# Patient Record
Sex: FEMALE | Race: WHITE | NOT HISPANIC OR LATINO | Employment: OTHER | URBAN - METROPOLITAN AREA
[De-identification: names, ages, dates, MRNs, and addresses within clinical notes are randomized per-mention and may not be internally consistent; named-entity substitution may affect disease eponyms.]

---

## 2024-04-28 ENCOUNTER — ANCILLARY PROCEDURE (OUTPATIENT)
Dept: GENERAL RADIOLOGY | Facility: CLINIC | Age: 74
End: 2024-04-28
Payer: MEDICARE

## 2024-04-28 ENCOUNTER — OFFICE VISIT (OUTPATIENT)
Dept: URGENT CARE | Facility: URGENT CARE | Age: 74
End: 2024-04-28
Payer: MEDICARE

## 2024-04-28 VITALS
OXYGEN SATURATION: 97 % | TEMPERATURE: 98.8 F | HEART RATE: 96 BPM | WEIGHT: 164.5 LBS | SYSTOLIC BLOOD PRESSURE: 123 MMHG | DIASTOLIC BLOOD PRESSURE: 76 MMHG | RESPIRATION RATE: 23 BRPM

## 2024-04-28 DIAGNOSIS — J45.901 EXACERBATION OF ASTHMA, UNSPECIFIED ASTHMA SEVERITY, UNSPECIFIED WHETHER PERSISTENT: ICD-10-CM

## 2024-04-28 DIAGNOSIS — J44.1 COPD EXACERBATION (H): Primary | ICD-10-CM

## 2024-04-28 DIAGNOSIS — R05.3 CHRONIC COUGH: ICD-10-CM

## 2024-04-28 PROCEDURE — 99203 OFFICE O/P NEW LOW 30 MIN: CPT | Mod: 25

## 2024-04-28 PROCEDURE — 94640 AIRWAY INHALATION TREATMENT: CPT

## 2024-04-28 PROCEDURE — 71046 X-RAY EXAM CHEST 2 VIEWS: CPT | Mod: TC | Performed by: RADIOLOGY

## 2024-04-28 RX ORDER — IPRATROPIUM BROMIDE AND ALBUTEROL SULFATE 2.5; .5 MG/3ML; MG/3ML
1 SOLUTION RESPIRATORY (INHALATION) EVERY 6 HOURS PRN
Qty: 90 ML | Refills: 0 | Status: CANCELLED | OUTPATIENT
Start: 2024-04-28

## 2024-04-28 RX ORDER — IBUPROFEN 800 MG/1
800 TABLET, FILM COATED ORAL EVERY 8 HOURS PRN
COMMUNITY
Start: 2023-06-05 | End: 2024-05-23

## 2024-04-28 RX ORDER — FLUTICASONE PROPIONATE AND SALMETEROL 500; 50 UG/1; UG/1
1 POWDER RESPIRATORY (INHALATION) 2 TIMES DAILY
COMMUNITY
End: 2024-09-18

## 2024-04-28 RX ORDER — AZITHROMYCIN 250 MG/1
TABLET, FILM COATED ORAL
Qty: 6 TABLET | Refills: 0 | Status: SHIPPED | OUTPATIENT
Start: 2024-04-28 | End: 2024-05-03

## 2024-04-28 RX ORDER — POTASSIUM CHLORIDE 750 MG/1
1 TABLET, EXTENDED RELEASE ORAL DAILY
COMMUNITY
Start: 2023-11-28 | End: 2024-09-17

## 2024-04-28 RX ORDER — ALENDRONATE SODIUM 70 MG/1
70 TABLET ORAL
COMMUNITY
Start: 2022-06-22 | End: 2024-09-17

## 2024-04-28 RX ORDER — BENZONATATE 200 MG/1
200 CAPSULE ORAL 3 TIMES DAILY PRN
COMMUNITY
Start: 2024-03-15 | End: 2024-05-23

## 2024-04-28 RX ORDER — ALBUTEROL SULFATE 90 UG/1
2 AEROSOL, METERED RESPIRATORY (INHALATION) EVERY 4 HOURS PRN
COMMUNITY
Start: 2023-09-29 | End: 2024-09-18

## 2024-04-28 RX ORDER — ALPRAZOLAM 0.25 MG
0.25 TABLET ORAL
COMMUNITY
Start: 2022-06-22

## 2024-04-28 RX ORDER — IPRATROPIUM BROMIDE AND ALBUTEROL SULFATE 2.5; .5 MG/3ML; MG/3ML
1 SOLUTION RESPIRATORY (INHALATION) EVERY 6 HOURS PRN
Qty: 90 ML | Refills: 0 | Status: SHIPPED | OUTPATIENT
Start: 2024-04-28

## 2024-04-28 RX ORDER — MONTELUKAST SODIUM 10 MG/1
1 TABLET ORAL AT BEDTIME
COMMUNITY
Start: 2023-09-06 | End: 2024-05-23

## 2024-04-28 RX ORDER — IPRATROPIUM BROMIDE AND ALBUTEROL SULFATE 2.5; .5 MG/3ML; MG/3ML
3 SOLUTION RESPIRATORY (INHALATION) ONCE
Status: COMPLETED | OUTPATIENT
Start: 2024-04-28 | End: 2024-04-28

## 2024-04-28 RX ORDER — METHIMAZOLE 10 MG/1
1 TABLET ORAL 3 TIMES DAILY
COMMUNITY
Start: 2023-08-23 | End: 2024-09-03

## 2024-04-28 RX ORDER — DM/P-EPHED/ACETAMINOPH/DOXYLAM
5000 LIQUID (ML) ORAL DAILY
COMMUNITY
Start: 2023-01-08 | End: 2024-09-17

## 2024-04-28 RX ORDER — CODEINE PHOSPHATE AND GUAIFENESIN 10; 100 MG/5ML; MG/5ML
5-10 SOLUTION ORAL
COMMUNITY
Start: 2024-03-14 | End: 2024-05-23

## 2024-04-28 RX ADMIN — IPRATROPIUM BROMIDE AND ALBUTEROL SULFATE 3 ML: 2.5; .5 SOLUTION RESPIRATORY (INHALATION) at 12:49

## 2024-04-28 NOTE — PROGRESS NOTES
ASSESSMENT:  (J44.1) COPD exacerbation (H)  (primary encounter diagnosis)  Plan: XR Chest 2 Views, ipratropium - albuterol 0.5         mg/2.5 mg/3 mL (DUONEB) neb solution 3 mL,         ipratropium - albuterol 0.5 mg/2.5 mg/3 mL         (DUONEB) 0.5-2.5 (3) MG/3ML neb solution,         Nebulizer and Supplies Order for DME - ONLY FOR        DME, azithromycin (ZITHROMAX) 250 MG tablet,         Adult Pulmonary Medicine  Referral    (R05.3) Chronic cough  Plan: XR Chest 2 Views, Adult Pulmonary Medicine          Referral    (J45.901) Exacerbation of asthma, unspecified asthma severity, unspecified whether persistent  Plan: XR Chest 2 Views, Adult Pulmonary Medicine          Referral    PLAN:  Reassessment of the patient after the DuoNeb treatment; the patient indicates that her breathing feels better and she is not wheezing as much as prior to the DuoNeb treatment.  Lungs upon auscultation yields improved airflow and are without wheezing.  Informed the patient that the chest x-ray shows punctate radiopacities project over the bilateral upper chest per the radiologist report.  Given the patient's COPD exacerbation; an antibiotic was prescribed.  In addition, given the improvement upon the patient's breathing and wheezing; DuoNeb prescribed for use at home.  Informed the patient to take the antibiotic as prescribed and finish the full course even if symptoms improve.  We discussed using the DuoNeb as prescribed.  Instructed the patient to follow-up with pulmonology for further evaluation and treatment and go to the emergency department with any new or worsening symptoms.  Patient acknowledged her understanding of the above plan.    The use of Dragon/Apture dictation services may have been used to construct the content in this note; any grammatical or spelling errors are non-intentional. Please contact the author of this note directly if you are in need of any clarification.      Seth VIEIRA  SHAD Dubois CNP      SUBJECTIVE:  Monique Gupta is a 74 year old female who presents to the clinic today with a chief complaint of cough , shortness of breath., wheezing., and chest tightness for 2 month(s).  Her cough is described as slightly productive.    The patient's symptoms are severe and worsening.  Associated symptoms include none. The patient's symptoms are exacerbated by no particular triggers  Patient has been using albuterol, Advair and singular to improve symptoms.  She has also had doxycycline which made her sick and prednisone to help with her symptoms.      ROS  Negative except noted above.     OBJECTIVE:  /76   Pulse 96   Temp 98.8  F (37.1  C) (Tympanic)   Resp 23   Wt 74.6 kg (164 lb 8 oz)   SpO2 97%   GENERAL APPEARANCE: healthy, alert and no distress  EYES: EOMI,  PERRL, conjunctiva clear  HENT: nose and mouth without erythema, ulcers or lesions and oral mucous membranes moist, no erythema noted  NECK: supple, nontender, no lymphadenopathy  RESP: expiratory wheezes bilaterally and decreased breath sounds bilaterally  CV: regular rates and rhythm, normal S1 S2, no murmur noted  SKIN: no suspicious lesions or rashes    X-RAY: chest x-ray shows punctate radiopacities project over the bilateral upper chest per the radiologist report.

## 2024-04-28 NOTE — PATIENT INSTRUCTIONS
Chest x-ray shows punctate radiopacities project over the bilateral upper chest per the radiologist report.  Take the antibiotic as prescribed and finish the full course even if symptoms improve.  Use the duoneb medication as prescribed.  Follow up with pulmonology for further evaluation and treatment.  Go to the emergency department with any new or worsening symptoms.

## 2024-04-29 ENCOUNTER — TELEPHONE (OUTPATIENT)
Dept: PULMONOLOGY | Facility: CLINIC | Age: 74
End: 2024-04-29
Payer: MEDICARE

## 2024-04-29 DIAGNOSIS — J44.9 COPD (CHRONIC OBSTRUCTIVE PULMONARY DISEASE) (H): Primary | ICD-10-CM

## 2024-04-29 NOTE — TELEPHONE ENCOUNTER
Called and spoke to pt to reschedule July appt with Ervin to Dr Montero on 5/7, due to 3-5 day urgent referral placed by urgent care. Pt is aware of appt time and location.

## 2024-05-19 ENCOUNTER — HEALTH MAINTENANCE LETTER (OUTPATIENT)
Age: 74
End: 2024-05-19

## 2024-05-23 ENCOUNTER — ANCILLARY PROCEDURE (OUTPATIENT)
Dept: GENERAL RADIOLOGY | Facility: CLINIC | Age: 74
End: 2024-05-23
Attending: PREVENTIVE MEDICINE
Payer: MEDICARE

## 2024-05-23 ENCOUNTER — OFFICE VISIT (OUTPATIENT)
Dept: FAMILY MEDICINE | Facility: CLINIC | Age: 74
End: 2024-05-23
Payer: MEDICARE

## 2024-05-23 ENCOUNTER — ORDERS ONLY (AUTO-RELEASED) (OUTPATIENT)
Dept: FAMILY MEDICINE | Facility: CLINIC | Age: 74
End: 2024-05-23

## 2024-05-23 VITALS
BODY MASS INDEX: 27.79 KG/M2 | OXYGEN SATURATION: 99 % | HEIGHT: 64 IN | TEMPERATURE: 97.8 F | SYSTOLIC BLOOD PRESSURE: 121 MMHG | HEART RATE: 80 BPM | RESPIRATION RATE: 16 BRPM | DIASTOLIC BLOOD PRESSURE: 76 MMHG | WEIGHT: 162.8 LBS

## 2024-05-23 DIAGNOSIS — Z12.11 SCREEN FOR COLON CANCER: ICD-10-CM

## 2024-05-23 DIAGNOSIS — E05.00 GRAVES DISEASE: ICD-10-CM

## 2024-05-23 DIAGNOSIS — M25.562 CHRONIC PAIN OF LEFT KNEE: ICD-10-CM

## 2024-05-23 DIAGNOSIS — G89.29 CHRONIC PAIN OF LEFT KNEE: ICD-10-CM

## 2024-05-23 DIAGNOSIS — M54.16 LEFT LUMBAR RADICULOPATHY: Primary | ICD-10-CM

## 2024-05-23 DIAGNOSIS — K44.9 HIATAL HERNIA: ICD-10-CM

## 2024-05-23 DIAGNOSIS — M54.16 LEFT LUMBAR RADICULOPATHY: ICD-10-CM

## 2024-05-23 DIAGNOSIS — Z63.6 CAREGIVER STRESS: ICD-10-CM

## 2024-05-23 DIAGNOSIS — Z12.31 VISIT FOR SCREENING MAMMOGRAM: ICD-10-CM

## 2024-05-23 PROBLEM — Z86.19 HISTORY OF HEPATITIS C: Status: ACTIVE | Noted: 2021-03-23

## 2024-05-23 PROCEDURE — 72100 X-RAY EXAM L-S SPINE 2/3 VWS: CPT | Mod: TC | Performed by: RADIOLOGY

## 2024-05-23 PROCEDURE — 99214 OFFICE O/P EST MOD 30 MIN: CPT | Performed by: PREVENTIVE MEDICINE

## 2024-05-23 PROCEDURE — 73562 X-RAY EXAM OF KNEE 3: CPT | Mod: TC | Performed by: RADIOLOGY

## 2024-05-23 RX ORDER — GABAPENTIN 300 MG/1
300 CAPSULE ORAL AT BEDTIME
Qty: 30 CAPSULE | Refills: 2 | Status: SHIPPED | OUTPATIENT
Start: 2024-05-23 | End: 2024-09-16

## 2024-05-23 RX ORDER — RESPIRATORY SYNCYTIAL VIRUS VACCINE 120MCG/0.5
0.5 KIT INTRAMUSCULAR ONCE
Qty: 1 EACH | Refills: 0 | Status: CANCELLED | OUTPATIENT
Start: 2024-05-23 | End: 2024-05-23

## 2024-05-23 RX ORDER — IBUPROFEN 600 MG/1
600 TABLET, FILM COATED ORAL EVERY 8 HOURS PRN
Qty: 90 TABLET | Refills: 0 | Status: SHIPPED | OUTPATIENT
Start: 2024-05-23 | End: 2024-09-03

## 2024-05-23 SDOH — SOCIAL STABILITY - SOCIAL INSECURITY: DEPENDENT RELATIVE NEEDING CARE AT HOME: Z63.6

## 2024-05-23 NOTE — PROGRESS NOTES
"  Assessment & Plan     Left lumbar radiculopathy  - REVIEW OF HEALTH MAINTENANCE PROTOCOL ORDERS  - XR Lumbar Spine 2/3 Views  - gabapentin (NEURONTIN) 300 MG capsule  Dispense: 30 capsule; Refill: 2  - Physical Therapy  Referral    Caregiver stress  -boyfriend with recent diagnosis of Lung cancer   - Adult Mental Health  Referral    Hiatal hernia  -refill on medication provided   - omeprazole (PRILOSEC) 20 MG DR capsule  Dispense: 90 capsule; Refill: 0    Chronic pain of left knee  -await imaging   -likely a popliteal cyst present  - XR Knee Left 3 Views  - ibuprofen (ADVIL/MOTRIN) 600 MG tablet  Dispense: 90 tablet; Refill: 0, GI side effects reviewed, no past renal disease or GI bleeds     Graves disease  -On Methimazole and Propranolol rarely if feels palpitations   - Adult Endocrinology  Referral    Screen for colon cancer  - [facility]([employer])    Visit for screening mammogram  - MA Screening Bilateral w/ Juve            BMI  Estimated body mass index is 28.39 kg/m  as calculated from the following:    Height as of this encounter: 1.613 m (5' 3.5\").    Weight as of this encounter: 73.8 kg (162 lb 12.8 oz).       Christine Amaya is a 74 year old, presenting for the following health issues:  established care and Pain (Pain from lower back down to knee)        5/23/2024    11:00 AM   Additional Questions   Roomed by Angie   Accompanied by self         5/23/2024    11:00 AM   Patient Reported Additional Medications   Patient reports taking the following new medications No     History of Present Illness       Reason for visit:  Thigh/knee pain, other, swollen ankles, OCD lesion in right ankle, need colonoscopy, endocrinologist ck Flores    She eats 2-3 servings of fruits and vegetables daily.She consumes 2 sweetened beverage(s) daily.She exercises with enough effort to increase her heart rate 9 or less minutes per day.  She exercises with enough effort to increase her heart rate " 3 or less days per week.   She is taking medications regularly.       Recent move here from [place]   Graves+  Just moved here in the last year  Was supposed to follow up, last seen in [place]  Taking medication Methimazole  Takes propranolol taken once every few month depending on symptoms    Mammogram needed  DEXA 3/23 IMPRESSION:  WHO Classification based on BMD: Osteoporosis (T-Score of -2.5 or lower).   Boyfriend with lung cancer, is care giver+       Breathing still with cough  ECHO done 10/22 with [facility] in [place], normal LVEF   Pulmonary appointment scheduled and for PFTs   Prednisone+ Antibiotics+  Was supposed to see Allergy in [place] but moved to [place]   2021 and 2022 had similar symptoms  No tobacco use    Concern - Pain in lower left back   Onset: A year ago   Description: pain radiates down to knees and feels a lump behind the knee   Intensity: severe  Progression of Symptoms:  worsening  Accompanying Signs & Symptoms: None  Previous history of similar problem: None   Precipitating factors:        Worsened by: Worsen at night   Alleviating factors:        Improved by: None   Therapies tried and outcome: OTC medications, prescribe medications did not help with pain.   Left hip and radiates to anterior thigh  Lump behind left knee  Some leg edema  800 mg of Motrin  Naprosyn  Pain is keeping awake at night      Concern - Blood in stool  Onset: A month ago    Description: patient states that there is no blood in stool but when wiping there is blood  Progression of Symptoms:  sometimes there is blood and sometimes there is nothing  Accompanying Signs & Symptoms: None  Previous history of similar problem: None  Therapies tried and outcome: None  Not in the stool  Just on wiping  Bright red blood  No pain  No lump at the rectum.   Slight constipation and that is when it started.   Better when not constipated     Review of Systems  Constitutional, HEENT, cardiovascular, pulmonary, gi and gu systems are  "negative, except as otherwise noted.      Objective    /76 (BP Location: Left arm, Patient Position: Sitting, Cuff Size: Adult Regular)   Pulse 80   Temp 97.8  F (36.6  C) (Tympanic)   Resp 16   Ht 1.613 m (5' 3.5\")   Wt 73.8 kg (162 lb 12.8 oz)   SpO2 99%   BMI 28.39 kg/m    Body mass index is 28.39 kg/m .  Physical Exam   GENERAL APPEARANCE: healthy, alert and no distress  EYES: Eyes grossly normal to inspection and conjunctivae and sclerae normal  NECK: no adenopathy and trachea midline and normal to palpation  RESP: few basilar crackles+   CV: regular rates and rhythm, normal S1 S2  ABDOMEN: soft, non-tender and no rebound or guarding   MS: extremities normal- no gross deformities noted and peripheral pulses normal  SKIN: no suspicious lesions or rashes  NEURO: Normal strength and tone, mentation intact and speech normal  PSYCH: mentation appears normal    Lumbar spine: No swelling, bruising, erythema atrophy or deformity.  No tenderness noted on palpation of spinous processes.  Range of motion of the lumbar spine is full in all directions without focal deficit.  Strength is full.  SLR negative bilaterally.  Distal pulses intact. No sacroiliac tenderness bilaterally.  Great toe extension normal.     Left knee: strength and range of motion intact, tender along medial joint line+ and small cyst + in popliteal fossa         No results found for this or any previous visit (from the past 24 hour(s)).        Signed Electronically by: Gabriella Cuenca MD MPH      "

## 2024-05-23 NOTE — RESULT ENCOUNTER NOTE
Monique,     X rays are not showing any acute bony abnormalities.  Mild calcium deposition arthritis seen.     Please do not hesitate to call us at [phone] if you have any questions or concerns.    Thank you,    Gabriella Cuenca MD MPH

## 2024-05-23 NOTE — PATIENT INSTRUCTIONS
Referral Details    Referred By  Referred To   Gabriella Cuenca MD   [address] [facility] [address]   Phone: [phone]   Fax: [phone]    Diagnoses: Graves disease   Order: Adult Endocrinology  Referral       Comment: Please be aware that coverage of these services is subject to the terms and limitations of your health insurance plan.  Call member services at your health plan with any benefit or coverage questions.    [employer] will call you to coordinate your care as prescribed by your provider. If you don't hear from a representative within 2 business days, please call [phone].           Gabriella Cuenca MD   [address] [facility] [address]   Phone: [phone]   Fax: [phone]    Diagnoses: Left lumbar radiculopathy   Order: Physical Therapy  Referral       Comment: Please be aware that coverage of these services is subject to the terms and limitations of your health insurance plan.  Call member services at your health plan with any benefit or coverage questions.    [employer] will call you to coordinate your care as prescribed by your provider. If you don't hear from a representative within 2 business days, please call [phone].        Gabriella Cuenca MD   [address] [facility] [address]   Phone: [phone]   Fax: [phone]    Diagnoses: Caregiver stress   Order: Adult Mental Health  Referral       Comment: Please be aware that coverage of these services is subject to the terms and limitations of your health insurance plan.  Call member services at your health plan with any benefit or coverage questions.    [employer] will call you to coordinate your care as prescribed by your provider. If you don't hear from a representative within 2 business days, please call [phone].

## 2024-05-23 NOTE — RESULT ENCOUNTER NOTE
Monique,     X-rays of the low back are showing moderate arthritis in the discs and the bones of the spine.  No fractures seen.  Plan of care and follow-up as discussed in clinic    Please do not hesitate to call us at [phone] if you have any questions or concerns.    Thank you,    Gabriella Cuenca MD MPH

## 2024-06-15 LAB — NONINV COLON CA DNA+OCC BLD SCRN STL QL: POSITIVE

## 2024-06-16 DIAGNOSIS — Z12.11 SCREEN FOR COLON CANCER: Primary | ICD-10-CM

## 2024-06-16 DIAGNOSIS — R19.5 POSITIVE COLORECTAL CANCER SCREENING USING COLOGUARD TEST: ICD-10-CM

## 2024-06-16 NOTE — RESULT ENCOUNTER NOTE
Batool Amaya test was Positive. You will need a colonoscopy for further evaluation. I have placed a referral for this, the schedulers will contact you.     Please do not hesitate to call us at [phone] if you have any questions or concerns.    Thank you,    Gabriella Cuenca MD MPH

## 2024-06-17 DIAGNOSIS — J44.1 COPD EXACERBATION (H): Primary | ICD-10-CM

## 2024-06-20 ENCOUNTER — ANCILLARY PROCEDURE (OUTPATIENT)
Dept: MAMMOGRAPHY | Facility: CLINIC | Age: 74
End: 2024-06-20
Attending: PREVENTIVE MEDICINE
Payer: MEDICARE

## 2024-06-20 DIAGNOSIS — Z12.31 VISIT FOR SCREENING MAMMOGRAM: ICD-10-CM

## 2024-06-20 PROCEDURE — 77063 BREAST TOMOSYNTHESIS BI: CPT | Performed by: RADIOLOGY

## 2024-06-20 PROCEDURE — 77067 SCR MAMMO BI INCL CAD: CPT | Performed by: RADIOLOGY

## 2024-07-10 ENCOUNTER — THERAPY VISIT (OUTPATIENT)
Dept: PHYSICAL THERAPY | Facility: CLINIC | Age: 74
End: 2024-07-10
Attending: PREVENTIVE MEDICINE
Payer: MEDICARE

## 2024-07-10 DIAGNOSIS — G89.29 CHRONIC LEFT-SIDED LOW BACK PAIN WITH LEFT-SIDED SCIATICA: Primary | ICD-10-CM

## 2024-07-10 DIAGNOSIS — M25.552 HIP PAIN, LEFT: ICD-10-CM

## 2024-07-10 DIAGNOSIS — M54.42 CHRONIC LEFT-SIDED LOW BACK PAIN WITH LEFT-SIDED SCIATICA: Primary | ICD-10-CM

## 2024-07-10 DIAGNOSIS — M54.16 LEFT LUMBAR RADICULOPATHY: ICD-10-CM

## 2024-07-10 PROCEDURE — 97110 THERAPEUTIC EXERCISES: CPT | Mod: GP | Performed by: PHYSICAL THERAPIST

## 2024-07-10 PROCEDURE — 97161 PT EVAL LOW COMPLEX 20 MIN: CPT | Mod: GP | Performed by: PHYSICAL THERAPIST
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
## 2024-09-03 ENCOUNTER — MYC REFILL (OUTPATIENT)
Dept: FAMILY MEDICINE | Facility: CLINIC | Age: 74
End: 2024-09-03
Payer: MEDICARE

## 2024-09-03 DIAGNOSIS — M25.562 CHRONIC PAIN OF LEFT KNEE: ICD-10-CM

## 2024-09-03 DIAGNOSIS — G89.29 CHRONIC PAIN OF LEFT KNEE: ICD-10-CM

## 2024-09-03 DIAGNOSIS — E05.00 GRAVES DISEASE: Primary | ICD-10-CM

## 2024-09-03 RX ORDER — METHIMAZOLE 10 MG/1
10 TABLET ORAL 3 TIMES DAILY
Qty: 90 TABLET | Refills: 0 | Status: SHIPPED | OUTPATIENT
Start: 2024-09-03 | End: 2024-09-18

## 2024-09-03 RX ORDER — IBUPROFEN 600 MG/1
600 TABLET, FILM COATED ORAL EVERY 8 HOURS PRN
Qty: 90 TABLET | Refills: 0 | Status: SHIPPED | OUTPATIENT
Start: 2024-09-03

## 2024-09-09 ENCOUNTER — MYC REFILL (OUTPATIENT)
Dept: FAMILY MEDICINE | Facility: CLINIC | Age: 74
End: 2024-09-09
Payer: MEDICARE

## 2024-09-09 DIAGNOSIS — E05.00 GRAVES DISEASE: ICD-10-CM

## 2024-09-09 RX ORDER — METHIMAZOLE 10 MG/1
10 TABLET ORAL 3 TIMES DAILY
Qty: 90 TABLET | Refills: 0 | OUTPATIENT
Start: 2024-09-09

## 2024-09-10 NOTE — PROGRESS NOTES
DISCHARGE  Reason for Discharge: Patient chooses to discontinue therapy.    Equipment Issued:     Discharge Plan: Patient to continue home program.    Referring Provider:  Gabriella Cuenca

## 2024-09-16 ENCOUNTER — OFFICE VISIT (OUTPATIENT)
Dept: URGENT CARE | Facility: URGENT CARE | Age: 74
End: 2024-09-16
Payer: MEDICARE

## 2024-09-16 ENCOUNTER — HOSPITAL ENCOUNTER (OUTPATIENT)
Facility: CLINIC | Age: 74
Setting detail: OBSERVATION
Discharge: HOME | End: 2024-09-18
Attending: EMERGENCY MEDICINE | Admitting: STUDENT IN AN ORGANIZED HEALTH CARE EDUCATION/TRAINING PROGRAM
Payer: MEDICARE

## 2024-09-16 VITALS
SYSTOLIC BLOOD PRESSURE: 119 MMHG | HEART RATE: 116 BPM | RESPIRATION RATE: 16 BRPM | BODY MASS INDEX: 27.1 KG/M2 | WEIGHT: 155.4 LBS | OXYGEN SATURATION: 97 % | DIASTOLIC BLOOD PRESSURE: 70 MMHG | TEMPERATURE: 98.4 F

## 2024-09-16 DIAGNOSIS — E05.00 GRAVES DISEASE: ICD-10-CM

## 2024-09-16 DIAGNOSIS — E05.00 GRAVES DISEASE: Chronic | ICD-10-CM

## 2024-09-16 DIAGNOSIS — I48.91 ATRIAL FIBRILLATION WITH RVR (H): ICD-10-CM

## 2024-09-16 DIAGNOSIS — Z87.09 PERSONAL HISTORY OF DISEASE OF RESPIRATORY SYSTEM: ICD-10-CM

## 2024-09-16 DIAGNOSIS — R00.2 PALPITATIONS: Primary | ICD-10-CM

## 2024-09-16 DIAGNOSIS — J44.89 CHRONIC OBSTRUCTIVE ASTHMA (H): Primary | Chronic | ICD-10-CM

## 2024-09-16 DIAGNOSIS — M25.552 HIP PAIN, LEFT: ICD-10-CM

## 2024-09-16 DIAGNOSIS — E05.90 HYPERTHYROIDISM: ICD-10-CM

## 2024-09-16 DIAGNOSIS — I48.91 ATRIAL FIBRILLATION, RAPID (H): ICD-10-CM

## 2024-09-16 LAB
ALBUMIN SERPL BCG-MCNC: 4.2 G/DL (ref 3.5–5.2)
ALP SERPL-CCNC: 70 U/L (ref 40–150)
ALT SERPL W P-5'-P-CCNC: 14 U/L (ref 0–50)
ANION GAP SERPL CALCULATED.3IONS-SCNC: 12 MMOL/L (ref 7–15)
AST SERPL W P-5'-P-CCNC: 20 U/L (ref 0–45)
ATRIAL RATE - MUSE: NORMAL BPM
BASOPHILS # BLD AUTO: 0 10E3/UL (ref 0–0.2)
BASOPHILS NFR BLD AUTO: 1 %
BILIRUB SERPL-MCNC: 0.4 MG/DL
BUN SERPL-MCNC: 19.5 MG/DL (ref 8–23)
CALCIUM SERPL-MCNC: 9.4 MG/DL (ref 8.8–10.4)
CHLORIDE SERPL-SCNC: 108 MMOL/L (ref 98–107)
CREAT SERPL-MCNC: 0.67 MG/DL (ref 0.51–0.95)
DIASTOLIC BLOOD PRESSURE - MUSE: NORMAL MMHG
EGFRCR SERPLBLD CKD-EPI 2021: >90 ML/MIN/1.73M2
EOSINOPHIL # BLD AUTO: 0 10E3/UL (ref 0–0.7)
EOSINOPHIL NFR BLD AUTO: 1 %
ERYTHROCYTE [DISTWIDTH] IN BLOOD BY AUTOMATED COUNT: 12.3 % (ref 10–15)
GLUCOSE SERPL-MCNC: 110 MG/DL (ref 70–99)
HCO3 SERPL-SCNC: 22 MMOL/L (ref 22–29)
HCT VFR BLD AUTO: 41.6 % (ref 35–47)
HGB BLD-MCNC: 14.1 G/DL (ref 11.7–15.7)
IMM GRANULOCYTES # BLD: 0 10E3/UL
IMM GRANULOCYTES NFR BLD: 0 %
INTERPRETATION ECG - MUSE: NORMAL
LYMPHOCYTES # BLD AUTO: 3 10E3/UL (ref 0.8–5.3)
LYMPHOCYTES NFR BLD AUTO: 55 %
MAGNESIUM SERPL-MCNC: 2.1 MG/DL (ref 1.7–2.3)
MCH RBC QN AUTO: 29.7 PG (ref 26.5–33)
MCHC RBC AUTO-ENTMCNC: 33.9 G/DL (ref 31.5–36.5)
MCV RBC AUTO: 88 FL (ref 78–100)
MONOCYTES # BLD AUTO: 0.8 10E3/UL (ref 0–1.3)
MONOCYTES NFR BLD AUTO: 15 %
NEUTROPHILS # BLD AUTO: 1.5 10E3/UL (ref 1.6–8.3)
NEUTROPHILS NFR BLD AUTO: 28 %
NRBC # BLD AUTO: 0 10E3/UL
NRBC BLD AUTO-RTO: 0 /100
P AXIS - MUSE: NORMAL DEGREES
PLATELET # BLD AUTO: 223 10E3/UL (ref 150–450)
POTASSIUM SERPL-SCNC: 4.4 MMOL/L (ref 3.4–5.3)
PR INTERVAL - MUSE: NORMAL MS
PROT SERPL-MCNC: 6.9 G/DL (ref 6.4–8.3)
QRS DURATION - MUSE: 72 MS
QT - MUSE: 284 MS
QTC - MUSE: 454 MS
R AXIS - MUSE: 48 DEGREES
RBC # BLD AUTO: 4.74 10E6/UL (ref 3.8–5.2)
SODIUM SERPL-SCNC: 142 MMOL/L (ref 135–145)
SYSTOLIC BLOOD PRESSURE - MUSE: NORMAL MMHG
T AXIS - MUSE: 58 DEGREES
T4 FREE SERPL-MCNC: 5.2 NG/DL (ref 0.9–1.7)
TSH SERPL DL<=0.005 MIU/L-ACNC: <0.01 UIU/ML (ref 0.3–4.2)
VENTRICULAR RATE- MUSE: 154 BPM
WBC # BLD AUTO: 5.4 10E3/UL (ref 4–11)

## 2024-09-16 PROCEDURE — 120N000002 HC R&B MED SURG/OB UMMC

## 2024-09-16 PROCEDURE — 250N000013 HC RX MED GY IP 250 OP 250 PS 637: Performed by: EMERGENCY MEDICINE

## 2024-09-16 PROCEDURE — 80053 COMPREHEN METABOLIC PANEL: CPT | Performed by: EMERGENCY MEDICINE

## 2024-09-16 PROCEDURE — 96361 HYDRATE IV INFUSION ADD-ON: CPT

## 2024-09-16 PROCEDURE — 99285 EMERGENCY DEPT VISIT HI MDM: CPT | Performed by: EMERGENCY MEDICINE

## 2024-09-16 PROCEDURE — 85025 COMPLETE CBC W/AUTO DIFF WBC: CPT | Performed by: EMERGENCY MEDICINE

## 2024-09-16 PROCEDURE — 258N000003 HC RX IP 258 OP 636: Performed by: EMERGENCY MEDICINE

## 2024-09-16 PROCEDURE — 96375 TX/PRO/DX INJ NEW DRUG ADDON: CPT | Performed by: EMERGENCY MEDICINE

## 2024-09-16 PROCEDURE — 36415 COLL VENOUS BLD VENIPUNCTURE: CPT | Performed by: EMERGENCY MEDICINE

## 2024-09-16 PROCEDURE — 250N000009 HC RX 250: Performed by: EMERGENCY MEDICINE

## 2024-09-16 PROCEDURE — 96361 HYDRATE IV INFUSION ADD-ON: CPT | Performed by: EMERGENCY MEDICINE

## 2024-09-16 PROCEDURE — 84439 ASSAY OF FREE THYROXINE: CPT | Performed by: EMERGENCY MEDICINE

## 2024-09-16 PROCEDURE — 84443 ASSAY THYROID STIM HORMONE: CPT | Performed by: EMERGENCY MEDICINE

## 2024-09-16 PROCEDURE — 83735 ASSAY OF MAGNESIUM: CPT | Performed by: EMERGENCY MEDICINE

## 2024-09-16 PROCEDURE — 99223 1ST HOSP IP/OBS HIGH 75: CPT | Mod: GC | Performed by: HOSPITALIST

## 2024-09-16 PROCEDURE — 93010 ELECTROCARDIOGRAM REPORT: CPT | Mod: 77 | Performed by: EMERGENCY MEDICINE

## 2024-09-16 PROCEDURE — 93000 ELECTROCARDIOGRAM COMPLETE: CPT | Performed by: FAMILY MEDICINE

## 2024-09-16 PROCEDURE — 96365 THER/PROPH/DIAG IV INF INIT: CPT

## 2024-09-16 PROCEDURE — 96374 THER/PROPH/DIAG INJ IV PUSH: CPT | Performed by: EMERGENCY MEDICINE

## 2024-09-16 PROCEDURE — 99285 EMERGENCY DEPT VISIT HI MDM: CPT | Mod: 25 | Performed by: EMERGENCY MEDICINE

## 2024-09-16 PROCEDURE — 99214 OFFICE O/P EST MOD 30 MIN: CPT | Performed by: FAMILY MEDICINE

## 2024-09-16 PROCEDURE — 93005 ELECTROCARDIOGRAM TRACING: CPT | Performed by: EMERGENCY MEDICINE

## 2024-09-16 RX ORDER — AMOXICILLIN 250 MG
1 CAPSULE ORAL 2 TIMES DAILY PRN
Status: DISCONTINUED | OUTPATIENT
Start: 2024-09-16 | End: 2024-09-18 | Stop reason: HOSPADM

## 2024-09-16 RX ORDER — METOPROLOL TARTRATE 25 MG/1
25 TABLET, FILM COATED ORAL ONCE
Status: COMPLETED | OUTPATIENT
Start: 2024-09-16 | End: 2024-09-16

## 2024-09-16 RX ORDER — ALBUTEROL SULFATE 90 UG/1
2 AEROSOL, METERED RESPIRATORY (INHALATION) EVERY 4 HOURS PRN
Status: DISCONTINUED | OUTPATIENT
Start: 2024-09-16 | End: 2024-09-18 | Stop reason: HOSPADM

## 2024-09-16 RX ORDER — AMOXICILLIN 250 MG
2 CAPSULE ORAL 2 TIMES DAILY PRN
Status: DISCONTINUED | OUTPATIENT
Start: 2024-09-16 | End: 2024-09-18 | Stop reason: HOSPADM

## 2024-09-16 RX ORDER — CALCIUM CARBONATE 500 MG/1
1000 TABLET, CHEWABLE ORAL 4 TIMES DAILY PRN
Status: DISCONTINUED | OUTPATIENT
Start: 2024-09-16 | End: 2024-09-18 | Stop reason: HOSPADM

## 2024-09-16 RX ORDER — ACETAMINOPHEN 500 MG
1000 TABLET ORAL ONCE
Status: COMPLETED | OUTPATIENT
Start: 2024-09-16 | End: 2024-09-16

## 2024-09-16 RX ORDER — METHIMAZOLE 10 MG/1
10 TABLET ORAL ONCE
Status: COMPLETED | OUTPATIENT
Start: 2024-09-16 | End: 2024-09-16

## 2024-09-16 RX ORDER — FLUTICASONE FUROATE AND VILANTEROL 200; 25 UG/1; UG/1
1 POWDER RESPIRATORY (INHALATION) DAILY
Status: DISCONTINUED | OUTPATIENT
Start: 2024-09-17 | End: 2024-09-18 | Stop reason: HOSPADM

## 2024-09-16 RX ORDER — PANTOPRAZOLE SODIUM 40 MG/1
40 TABLET, DELAYED RELEASE ORAL
Status: DISCONTINUED | OUTPATIENT
Start: 2024-09-17 | End: 2024-09-18 | Stop reason: HOSPADM

## 2024-09-16 RX ORDER — METOPROLOL TARTRATE 1 MG/ML
5 INJECTION, SOLUTION INTRAVENOUS ONCE
Status: COMPLETED | OUTPATIENT
Start: 2024-09-16 | End: 2024-09-16

## 2024-09-16 RX ORDER — METHIMAZOLE 10 MG/1
10 TABLET ORAL 3 TIMES DAILY
Status: DISCONTINUED | OUTPATIENT
Start: 2024-09-17 | End: 2024-09-18 | Stop reason: HOSPADM

## 2024-09-16 RX ORDER — IPRATROPIUM BROMIDE AND ALBUTEROL SULFATE 2.5; .5 MG/3ML; MG/3ML
1 SOLUTION RESPIRATORY (INHALATION) EVERY 6 HOURS PRN
Status: DISCONTINUED | OUTPATIENT
Start: 2024-09-16 | End: 2024-09-18 | Stop reason: HOSPADM

## 2024-09-16 RX ORDER — ALENDRONATE SODIUM 70 MG/1
70 TABLET ORAL
Status: DISCONTINUED | OUTPATIENT
Start: 2024-09-16 | End: 2024-09-16

## 2024-09-16 RX ORDER — LIDOCAINE 40 MG/G
CREAM TOPICAL
Status: DISCONTINUED | OUTPATIENT
Start: 2024-09-16 | End: 2024-09-18 | Stop reason: HOSPADM

## 2024-09-16 RX ADMIN — DILTIAZEM HYDROCHLORIDE 5 MG/HR: 5 INJECTION, SOLUTION INTRAVENOUS at 22:33

## 2024-09-16 RX ADMIN — METOPROLOL TARTRATE 5 MG: 5 INJECTION INTRAVENOUS at 20:07

## 2024-09-16 RX ADMIN — METHIMAZOLE 10 MG: 10 TABLET ORAL at 21:36

## 2024-09-16 RX ADMIN — SODIUM CHLORIDE 1000 ML: 9 INJECTION, SOLUTION INTRAVENOUS at 19:43

## 2024-09-16 RX ADMIN — METOPROLOL TARTRATE 25 MG: 25 TABLET, FILM COATED ORAL at 20:06

## 2024-09-16 RX ADMIN — METOPROLOL TARTRATE 5 MG: 5 INJECTION INTRAVENOUS at 20:29

## 2024-09-16 RX ADMIN — ACETAMINOPHEN 1000 MG: 500 TABLET ORAL at 20:29

## 2024-09-16 ASSESSMENT — COLUMBIA-SUICIDE SEVERITY RATING SCALE - C-SSRS
2. HAVE YOU ACTUALLY HAD ANY THOUGHTS OF KILLING YOURSELF IN THE PAST MONTH?: NO
6. HAVE YOU EVER DONE ANYTHING, STARTED TO DO ANYTHING, OR PREPARED TO DO ANYTHING TO END YOUR LIFE?: NO
1. IN THE PAST MONTH, HAVE YOU WISHED YOU WERE DEAD OR WISHED YOU COULD GO TO SLEEP AND NOT WAKE UP?: NO

## 2024-09-16 ASSESSMENT — ACTIVITIES OF DAILY LIVING (ADL)
ADLS_ACUITY_SCORE: 35

## 2024-09-16 NOTE — PROGRESS NOTES
(R00.2) Palpitations  (primary encounter diagnosis)  Comment:   Plan: EKG 12-lead complete w/read - Clinics            (E05.00) Graves disease  Comment:   Typically takes methimazole.  Had an Rx for propranolol for palpitations.  No meds for 2 months.  Plan:       (I48.91) Atrial fibrillation, rapid (H)  Comment:   EKG shows atrial fibrillation with heart rate 150.  Plan:   She was referred to the ER for further management.  [facility] recommended.  Her boyfriend will be driving her there.        CHIEF COMPLAINT    Palpitations.  Thyroid disease.  Needs medications.      HISTORY    She has moved here, I believe from [place].    Has a history of Graves' disease.  Was previously on methimazole but has been out of this med for 2 months.  She felt like her heart was racing today and took 1 dose of propranolol 10 mg which she had available.  She came into urgent care asking if she could get some med refills.    She is feeling somewhat anxious and experiencing palpitations.      Patient Active Problem List   Diagnosis    Age-related osteoporosis without current pathological fracture    Anorexia    Arthralgia of multiple sites    Chronic obstructive asthma (H)    Chronic tension-type headache    Dyslipidemia    Eating disorder    Fibromyalgia    Graves disease    History of hepatitis C    Myofascial pain syndrome    Vitamin D deficiency    Chronic left-sided low back pain with left-sided sciatica    Hip pain, left       Current Outpatient Medications   Medication Sig Dispense Refill    albuterol (PROAIR HFA/PROVENTIL HFA/VENTOLIN HFA) 108 (90 Base) MCG/ACT inhaler Inhale 2 puffs into the lungs every 4 hours as needed      ALPRAZolam (XANAX) 0.25 MG tablet Take 0.25 mg by mouth nightly as needed      cholecalciferol (D 5000) 125 MCG (5000 UT) CAPS Take 5,000 Units by mouth daily      fluticasone-salmeterol (ADVAIR) 500-50 MCG/ACT inhaler Inhale 1 puff into the lungs 2 times daily      ibuprofen (ADVIL/MOTRIN) 600  MG tablet Take 1 tablet (600 mg) by mouth every 8 hours as needed for moderate pain. 90 tablet 0    ipratropium - albuterol 0.5 mg/2.5 mg/3 mL (DUONEB) 0.5-2.5 (3) MG/3ML neb solution Take 1 vial (3 mLs) by nebulization every 6 hours as needed for shortness of breath, wheezing or cough 90 mL 0    methimazole (TAPAZOLE) 10 MG tablet Take 1 tablet (10 mg) by mouth 3 times daily. 90 tablet 0    omeprazole (PRILOSEC) 20 MG DR capsule Take 1 capsule (20 mg) by mouth daily 90 capsule 0    alendronate (FOSAMAX) 70 MG tablet Take 70 mg by mouth every 7 days (Patient not taking: Reported on 9/16/2024)      gabapentin (NEURONTIN) 300 MG capsule Take 1 capsule (300 mg) by mouth at bedtime 30 capsule 2    methylPREDNISolone (MEDROL DOSEPAK) 4 MG tablet therapy pack Follow Package Directions 21 tablet 0    potassium chloride ER (K-TAB/KLOR-CON) 10 MEQ CR tablet Take 1 tablet by mouth daily (Patient not taking: Reported on 9/16/2024)         REVIEW OF SYSTEMS    No fever or chills.  Some weight loss.  No cough or SOB.  No chest pain.  No edema.  No nausea or abdominal pain.  No urinary difficulty.  Some generalized fatigue.  No focal weakness.      EXAM  /70   Pulse 116   Temp 98.4  F (36.9  C) (Tympanic)   Resp 16   Wt 70.5 kg (155 lb 6.4 oz)   SpO2 97%   BMI 27.10 kg/m      Alert.  Somewhat anxious.  H EENT unremarkable.  No thyromegaly.  Nonlabored breathing.  Rapid irregular pulse,  Cardiac irregularly irregular without murmur or rub.  Abdomen nontender.  Extremities without edema.      EKG  Atrial fibrillation rhythm, rate 150.  Axis normal.  ST segments and T waves WNL.  No ectopy.  Abnormal EKG with atrial fibrillation and rapid ventricular rate.  No previous for comparison.

## 2024-09-17 LAB
ANION GAP SERPL CALCULATED.3IONS-SCNC: 9 MMOL/L (ref 7–15)
BUN SERPL-MCNC: 17.9 MG/DL (ref 8–23)
CALCIUM SERPL-MCNC: 9 MG/DL (ref 8.8–10.4)
CHLORIDE SERPL-SCNC: 111 MMOL/L (ref 98–107)
CREAT SERPL-MCNC: 0.62 MG/DL (ref 0.51–0.95)
EGFRCR SERPLBLD CKD-EPI 2021: >90 ML/MIN/1.73M2
ERYTHROCYTE [DISTWIDTH] IN BLOOD BY AUTOMATED COUNT: 12.3 % (ref 10–15)
GLUCOSE SERPL-MCNC: 102 MG/DL (ref 70–99)
HCO3 SERPL-SCNC: 21 MMOL/L (ref 22–29)
HCT VFR BLD AUTO: 38.8 % (ref 35–47)
HGB BLD-MCNC: 13 G/DL (ref 11.7–15.7)
MCH RBC QN AUTO: 29.5 PG (ref 26.5–33)
MCHC RBC AUTO-ENTMCNC: 33.5 G/DL (ref 31.5–36.5)
MCV RBC AUTO: 88 FL (ref 78–100)
PLATELET # BLD AUTO: 178 10E3/UL (ref 150–450)
POTASSIUM SERPL-SCNC: 4 MMOL/L (ref 3.4–5.3)
RBC # BLD AUTO: 4.41 10E6/UL (ref 3.8–5.2)
SODIUM SERPL-SCNC: 141 MMOL/L (ref 135–145)
WBC # BLD AUTO: 4.2 10E3/UL (ref 4–11)

## 2024-09-17 PROCEDURE — 258N000003 HC RX IP 258 OP 636: Performed by: EMERGENCY MEDICINE

## 2024-09-17 PROCEDURE — 85027 COMPLETE CBC AUTOMATED: CPT

## 2024-09-17 PROCEDURE — 80048 BASIC METABOLIC PNL TOTAL CA: CPT

## 2024-09-17 PROCEDURE — 250N000013 HC RX MED GY IP 250 OP 250 PS 637: Performed by: NURSE PRACTITIONER

## 2024-09-17 PROCEDURE — 250N000009 HC RX 250: Performed by: EMERGENCY MEDICINE

## 2024-09-17 PROCEDURE — 99222 1ST HOSP IP/OBS MODERATE 55: CPT | Mod: GC | Performed by: INTERNAL MEDICINE

## 2024-09-17 PROCEDURE — 250N000013 HC RX MED GY IP 250 OP 250 PS 637: Performed by: HOSPITALIST

## 2024-09-17 PROCEDURE — 99233 SBSQ HOSP IP/OBS HIGH 50: CPT

## 2024-09-17 PROCEDURE — 250N000013 HC RX MED GY IP 250 OP 250 PS 637

## 2024-09-17 PROCEDURE — 36415 COLL VENOUS BLD VENIPUNCTURE: CPT

## 2024-09-17 PROCEDURE — 96366 THER/PROPH/DIAG IV INF ADDON: CPT

## 2024-09-17 PROCEDURE — G0378 HOSPITAL OBSERVATION PER HR: HCPCS

## 2024-09-17 PROCEDURE — 82310 ASSAY OF CALCIUM: CPT

## 2024-09-17 RX ORDER — PROPRANOLOL HYDROCHLORIDE 10 MG/1
10 TABLET ORAL PRN
COMMUNITY
Start: 2023-03-08 | End: 2024-09-18

## 2024-09-17 RX ORDER — IBUPROFEN 600 MG/1
600 TABLET, FILM COATED ORAL EVERY 8 HOURS PRN
Status: DISCONTINUED | OUTPATIENT
Start: 2024-09-17 | End: 2024-09-18 | Stop reason: HOSPADM

## 2024-09-17 RX ORDER — ACETAMINOPHEN 325 MG/1
650 TABLET ORAL EVERY 4 HOURS PRN
Status: DISCONTINUED | OUTPATIENT
Start: 2024-09-17 | End: 2024-09-18 | Stop reason: HOSPADM

## 2024-09-17 RX ORDER — PROPRANOLOL HYDROCHLORIDE 20 MG/1
20 TABLET ORAL 3 TIMES DAILY
Status: DISCONTINUED | OUTPATIENT
Start: 2024-09-17 | End: 2024-09-18 | Stop reason: HOSPADM

## 2024-09-17 RX ADMIN — IBUPROFEN 600 MG: 600 TABLET, FILM COATED ORAL at 10:35

## 2024-09-17 RX ADMIN — PANTOPRAZOLE SODIUM 40 MG: 40 TABLET, DELAYED RELEASE ORAL at 07:53

## 2024-09-17 RX ADMIN — METHIMAZOLE 10 MG: 10 TABLET ORAL at 09:45

## 2024-09-17 RX ADMIN — METHIMAZOLE 10 MG: 10 TABLET ORAL at 13:24

## 2024-09-17 RX ADMIN — PROPRANOLOL HYDROCHLORIDE 20 MG: 20 TABLET ORAL at 14:03

## 2024-09-17 RX ADMIN — ACETAMINOPHEN 650 MG: 325 TABLET ORAL at 21:34

## 2024-09-17 RX ADMIN — DILTIAZEM HYDROCHLORIDE 10 MG/HR: 5 INJECTION, SOLUTION INTRAVENOUS at 11:38

## 2024-09-17 RX ADMIN — FLUTICASONE FUROATE AND VILANTEROL TRIFENATATE 1 PUFF: 200; 25 POWDER RESPIRATORY (INHALATION) at 07:55

## 2024-09-17 RX ADMIN — Medication 5000 UNITS: at 10:20

## 2024-09-17 RX ADMIN — PROPRANOLOL HYDROCHLORIDE 20 MG: 20 TABLET ORAL at 21:09

## 2024-09-17 RX ADMIN — ALBUTEROL SULFATE 2 PUFF: 90 AEROSOL, METERED RESPIRATORY (INHALATION) at 21:40

## 2024-09-17 RX ADMIN — METHIMAZOLE 10 MG: 10 TABLET ORAL at 21:09
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
## 2024-09-17 NOTE — CONSULTS
Endocrine Consult note     Assessment/Plan :     Thyrotoxicosis secondary to seropositive Graves disease.   Atrial fibrillation in the setting elevated thyroid levels   Graves' orbitopathy     Miss Gupta, 74, diagnosed with Graves' disease in 2011, has a history of uncontrolled hyperthyroidism due to medication non-compliance. She has used methimazole in varying doses from 10 to 60 mg daily.  Over the years, the lab work has shown persistent biochemical hyperthyroidism. She discontinued the medication  2-3 months ago and her symptoms recurred, leading to a hyperthyroid state and atrial fibrillation.  The thyroid gland was slightly enlarged on the prior thyroid ultrasound from 2019.  Most recent thyrotropin receptor antibody strongly positive in March 2023.    We discussed with the patient about the long-term negative consequences of uncontrolled hyperthyroidism on the cardiac and bone health.  In view of her history, a more appropriate approach would be to either consider radioiodine treatment or thyroidectomy.  She does have mild evidence of proptosis and thyroidectomy might be a better option.  Discussed about the importance of controlling the thyroid hormone levels prior to undergoing surgery.  We also recommended to establish care in the ophthalmology clinic at the [facility].    Plan:   - Continue with Methimazole 10 mg three times a day  - Beta-blocker per cardiology  - Get TT3, FT4 tomorrow morning  - Endocrinology will continue to follow     Elizabeth Matta MD   Endocrinology Fellow   Page # [phone]     Case was discussed and reviewed with Dr Waters    __________________________________________________________________________    Chief complaint:  Monique is a 74 year old female seen in consultation at the request by ED for thyrotoxicosis with atrial fibrillation with RVR .     HISTORY OF PRESENT ILLNESS  Monique Gupta is a 74-year-old female with a history of chronic obstructive asthma and  Graves' disease diagnosed in 2011, presenting to the emergency department with tachycardia. On September 16, 2024, she began experiencing palpitations distinct from her usual episodes and sought initial care at an urgent care facility before being referred to the ER. There she was found to be tachycardic,  bpm, with atrial fibrillation.     She has been without methimazole for the past 2.5 months due to a lapse in insurance coverage. Her symptoms include light-headedness, palpitations, heat intolerance, tremor and clammy skin.  There is a longstanding history of dry eyes.  Although propranolol was prescribed by her endocrinologist in [place] for palpitations, it did not relieve her symptoms.    During today s encounter, the patient reported non-compliance with her medications. Treatment with methimazole was associated with unwanted weight gain. Patient previously was offered a thyroidectomy in [place] due to persistently elevated thyroid levels. However she refused.   Prior DXA scan from 2020 revealed osteoporosis at the 33% distal radius and osteopenia at the left hip.     Endocrine relevant labs are as follows:    Most Recent 3 CBC's:  Recent Labs   Lab Test 09/17/24 0646 09/16/24 1942   WBC 4.2 5.4   HGB 13.0 14.1   MCV 88 88    223      Most Recent 3 BMP's:  Recent Labs   Lab Test 09/17/24 0646 09/16/24 1942    142   POTASSIUM 4.0 4.4   CHLORIDE 111* 108*   CO2 21* 22   BUN 17.9 19.5   CR 0.62 0.67   ANIONGAP 9 12   JENNIFER 9.0 9.4   * 110*     Most Recent 2 LFT's:  Recent Labs   Lab Test 09/16/24 1942   AST 20   ALT 14   ALKPHOS 70   BILITOTAL 0.4     Most Recent TSH, T4 and A1c Labs:  Recent Labs   Lab Test 09/16/24 1942   TSH <0.01*   T4 5.20*     Component Value Date/Time  TSH <0.008 (L) 02/28/2023 09:33 AM  TSH 9.072 (H) 10/14/2022 08:55 AM  TSH <0.008 (L) 06/24/2022 09:13 AM  FREET4 2.82 (H) 02/28/2023 09:33 AM  FREET4 0.58 (L) 10/14/2022 08:55 AM  FREET4 1.70 (H) 06/24/2022  09:13 AM   TRAB 23.20 (H) 02/28/2023 09:33 AM   TSH Receptor Ab 19.50 (H) <=1.75 IU/L   03/28/2023 09/16/2024: FT4: 5.20       Relevant imaging is as follows: (as read by me as it pertains to endocrine relevant organs)    US thyroid on Sept 20, 2019. Enlarged.   Right Lobe: 5.7 x 2.2 x 2.3 cm. Diffuse heterogeneity and prominent blood flow without discrete nodule. Left Lobe: 5.7 x 1.8 x 2.5 cm. Diffuse heterogeneity and prominent blood flow without discrete nodule. Thyroid isthmus measures 5 mm.     REVIEW OF SYSTEMS    Warm, anxious, tremor, palpitations.    Denies skin rashes.   However feels better than yesterday.     Past Medical History  No past medical history on file.    Medications  Current Outpatient Medications   Medication Sig Dispense Refill    albuterol (PROAIR HFA/PROVENTIL HFA/VENTOLIN HFA) 108 (90 Base) MCG/ACT inhaler Inhale 2 puffs into the lungs every 4 hours as needed      ALPRAZolam (XANAX) 0.25 MG tablet Take 0.25 mg by mouth nightly as needed      fluticasone-salmeterol (ADVAIR) 500-50 MCG/ACT inhaler Inhale 1 puff into the lungs 2 times daily      ibuprofen (ADVIL/MOTRIN) 600 MG tablet Take 1 tablet (600 mg) by mouth every 8 hours as needed for moderate pain. 90 tablet 0    ipratropium - albuterol 0.5 mg/2.5 mg/3 mL (DUONEB) 0.5-2.5 (3) MG/3ML neb solution Take 1 vial (3 mLs) by nebulization every 6 hours as needed for shortness of breath, wheezing or cough 90 mL 0    methimazole (TAPAZOLE) 10 MG tablet Take 1 tablet (10 mg) by mouth 3 times daily. 90 tablet 0    omeprazole (PRILOSEC) 20 MG DR capsule Take 1 capsule (20 mg) by mouth daily 90 capsule 0    propranolol (INDERAL) 10 MG tablet Take 10 mg by mouth as needed.         Allergies  Allergies   Allergen Reactions    Hydrocodone-Acetaminophen Itching    Penicillins Hives and Nausea and Vomiting     CONV. REACTION:Hives, CONV. REACTION:Vomiting    Sulfa Antibiotics Hives and Nausea and Vomiting     CONV. REACTION:Hives, CONV.  "REACTION:Vomiting         Family History  family history is not on file.    Social History  Social History     Tobacco Use    Smoking status: Never    Smokeless tobacco: Never   Vaping Use    Vaping status: Never Used       Physical Exam  /65 (BP Location: Right arm)   Pulse 76   Temp 97.8  F (36.6  C) (Oral)   Resp 16   Ht 1.6 m (5' 3\")   Wt 70.8 kg (156 lb)   SpO2 99%   BMI 27.63 kg/m    Body mass index is 27.63 kg/m .  GENERAL :  anixous  SKIN: Normal color, normal temperature, texture.    EYES: Questionable mild proptosis, no conjunctival redness, no lid lag or stare  NECK: No visible masses. No palpable adenopathy, or masses.  THYROID:  Mildly enlarged, nontender, smooth texture,  no palpable nodules, no bruit   RESP: Lungs clear to auscultation bilaterally  CARDIAC: Regular rate and rhythm  NEURO: awake, alert, responds appropriately to questions.  A little flat mood.  Fine tremor of the outstretched hands, bicipital and knee reflexes slightly overactive.  EXTREMITIES: No clubbing, cyanosis or non pitting edema.    DATA REVIEW       DATE/TIME:  3/2/2023 10:35 AM    HISTORY:  Hyperthyroidism.    COMPARISON:  January 15, 2020.    TECHNIQUE:  Dual-energy X-ray absorptiometry (DXA) was performed on a [employer] REVA central device.  Bone mineral density (BMD) measurements were obtained.  The following information on each individual patient can be found on the [facility] Physician Portal or on the [facility] PACS website ([email or web address]).     SPINE ANALYSIS (L1-L4):    Average lumbar BMD (g/cm2):  1.112  T-score:  0.6  Z-score:  2.9    CHANGE SINCE PRIOR SPINE EXAMINATION:  No comparison studies are available.    LEFT HIP ANALYSIS:    Left total hip BMD (g/cm2):  0.792  T-score:  -1.2  Z-score:  0.4    CHANGE SINCE PRIOR LEFT HIP EXAMINATION:  Interval increase in bone mineral density of 4%, statistically significant    RIGHT HIP ANALYSIS:    Right femoral neck BMD (g/cm2):  0.730  T-score:  -1.1  Z-score:  " 0.9    CHANGE SINCE PRIOR RIGHT HIP EXAMINATION:  No comparison studies are available.    LEFT FOREARM (1/3 RADIUS) ANALYSIS:  Left forearm BMD (g/cm2):  0.480  T-score:  -3.6  Z-score:  -1.2    I, Talia Waters, was present with the fellow who participated in the service and in the documentation of the note.  I have verified the history and personally performed the physical exam and medical decision making.  I agree with the assessment and plan of care as documented in the note.     Talia Waters MD

## 2024-09-17 NOTE — MEDICATION SCRIBE - ADMISSION MEDICATION HISTORY
Medication Scribe Admission Medication History    Admission medication history is complete. The information provided in this note is only as accurate as the sources available at the time of the update.    Information Source(s): Patient and CareEverywhere/SureScripts via in-person    Pertinent Information: Patient stated she has not been on Methimazole for almost 3-4 months and her provider had recommended to restart it. She also stated she was directed to take Propranolol PRN.    Changes made to PTA medication list:  Added: propranolol (INDERAL) 10 MG   Deleted: alendronate 70 mg (not taking), cholecalciferol 125 mcg (not taking), potassium chloride 10 MEQ (not taking).  Changed: None    Allergies reviewed with patient and updates made in EHR: yes    Medication History Completed By: Maria Del Carmen Melo 9/17/2024 9:34 AM    PTA Med List   Medication Sig Note Last Dose    albuterol (PROAIR HFA/PROVENTIL HFA/VENTOLIN HFA) 108 (90 Base) MCG/ACT inhaler Inhale 2 puffs into the lungs every 4 hours as needed  9/16/2024 at PM    ALPRAZolam (XANAX) 0.25 MG tablet Take 0.25 mg by mouth nightly as needed  More than a month    fluticasone-salmeterol (ADVAIR) 500-50 MCG/ACT inhaler Inhale 1 puff into the lungs 2 times daily  9/16/2024 at AM    ibuprofen (ADVIL/MOTRIN) 600 MG tablet Take 1 tablet (600 mg) by mouth every 8 hours as needed for moderate pain.  Past Week    ipratropium - albuterol 0.5 mg/2.5 mg/3 mL (DUONEB) 0.5-2.5 (3) MG/3ML neb solution Take 1 vial (3 mLs) by nebulization every 6 hours as needed for shortness of breath, wheezing or cough  Unknown    methimazole (TAPAZOLE) 10 MG tablet Take 1 tablet (10 mg) by mouth 3 times daily. 9/17/2024: Ran out More than a month    omeprazole (PRILOSEC) 20 MG DR capsule Take 1 capsule (20 mg) by mouth daily  Past Month    propranolol (INDERAL) 10 MG tablet Take 10 mg by mouth as needed.  9/16/2024 at PM

## 2024-09-17 NOTE — ED TRIAGE NOTES
Patient seen at  today for tachycardia EKG shows aflutter with RVR. Patient also having a mild headache and feels a light headed.      Triage Assessment (Adult)       Row Name 09/16/24 1921          Triage Assessment    Airway WDL WDL        Respiratory WDL    Respiratory WDL WDL        Skin Circulation/Temperature WDL    Skin Circulation/Temperature WDL WDL        Cardiac WDL    Cardiac WDL X;rhythm     Cardiac Rhythm Atrial flutter        Peripheral/Neurovascular WDL    Peripheral Neurovascular WDL WDL        Cognitive/Neuro/Behavioral WDL    Cognitive/Neuro/Behavioral WDL WDL

## 2024-09-17 NOTE — ED PROVIDER NOTES
"    [place] EMERGENCY DEPARTMENT ([facility])    9/16/24       ED PROVIDER NOTE       History   No chief complaint on file.    HPI  Monique Gupta is a 74 year old female with a history of a chronic obstructive asthma and graves' disease   who presents to the emergency department for tachycardia.    {History Review Selection (Optional):847430}  {ROS Selection (Optional):863946}    Physical Exam      Physical Exam  Physical Exam   Constitutional: Pt is oriented to person, place, and time.Pt appears well-developed and well-nourished.   HENT:   Head: Normocephalic and atraumatic.   Eyes: Conjunctivae are normal. Pupils are equal, round, and reactive to light.   Neck: Normal range of motion. Neck supple.   Cardiovascular: Normal rate, regular rhythm, normal heart sounds and intact distal pulses.    Pulmonary/Chest: Effort normal and breath sounds normal. No respiratory distress. Pt has no wheezes. Pt has no rales  Abdominal: Soft. Bowel sounds are normal. Pt exhibits no distension and no mass. No tenderness. Pt has no rebound and no guarding.   Musculoskeletal: Normal range of motion. Pt exhibits no edema.   Neurological: Pt is alert and oriented to person, place, and time. Normal reflexes.   Skin: Skin is warm and dry. No rash noted.   Psychiatric: Pt has a normal mood and affect. Behavior is normal. Judgment and thought content normal.      ED Course, Procedures, & Data      Procedures       {ED Course Selections (Optional):958452}  {ED Sepsis CMS Documentation (Optional):203443::\" \"}       No results found for any visits on 09/16/24.  Medications - No data to display  Labs Ordered and Resulted from Time of ED Arrival to Time of ED Departure - No data to display  No orders to display          {Critical Care Performed?:627397}    Assessment & Plan    ***    I have reviewed the nursing notes. I have reviewed the findings, diagnosis, plan and need for follow up with the patient.    New Prescriptions    No " medications on file       Final diagnoses:   None       Saturnino Sandra***  [facility] EMERGENCY DEPARTMENT  9/16/2024

## 2024-09-17 NOTE — UTILIZATION REVIEW
"  Admission Status; Secondary Review Determination         Under the authority of the Utilization Management Committee, the utilization review process indicated a secondary review on the above patient.  The review outcome is based on review of the medical records, discussions with staff, and applying clinical experience noted on the date of the review.          (x) Observation Status Appropriate - This patient does not meet hospital inpatient criteria and is placed in observation status. If this patient's primary payer is Medicare and was admitted as an inpatient, Condition Code 44 should be used and patient status changed to \"observation\".     RATIONALE FOR DETERMINATION     The patient is a 74-year-old female admitted on 9/16/2024.  Patient came to the ED with lightheadedness and palpitation and clamminess.  She has A-fib with rapid ventricular response.  Documentation in the chart does show pulse rates up to 158.  SpO2 is normal.  Blood pressures are stable.  Patient has longstanding Graves' disease and poor compliance with taking methimazole.  She also has asthma and uses albuterol inhalers.  Endocrinology was consulted and assessed thyrotoxicosis secondary to Graves' disease with atrial fibs and elevated thyroid state.  Recommendation was to stay with methimazole 10 mg 3 times a day and obtained total T3 and free T4 in the morning.  Based on current diagnosis and treatment, would recommend switching from inpatient to observation status.  Heart rate has decreased significantly with metoprolol and diltiazem drip.  Recommend switching from inpatient status to observation status.  Dr. Shen will be notified of this recommendation.    The severity of illness, intensity of service provided, expected LOS and risk for adverse outcome make the care appropriate for further observation; however, doesn't meet criteria for hospital inpatient admission.   notified of this determination.        The information on " this document is developed by the utilization review team in order for the business office to ensure compliance.  This only denotes the appropriateness of proper admission status and does not reflect the quality of care rendered.         The definitions of Inpatient Status and Observation Status used in making the determination above are those provided in the CMS Coverage Manual, Chapter 1 and Chapter 6, section 70.4.      Sincerely,     Chaka Gray MD  Physician Advisor  Utilization Review/ Case Management  [facility].

## 2024-09-17 NOTE — ED PROVIDER NOTES
[place] EMERGENCY DEPARTMENT ([facility])    9/16/24       ED PROVIDER NOTE       History     Chief Complaint   Patient presents with    Tachycardia     Patient seen at  EKG aflutter with RVR      HPI  Monique Gupta is a 74 year old female with a history of a chronic obstructive asthma and graves' disease  who presents to the emergency department for tachycardia.  Around 1700 she started experiencing palpitations different from her usual palpitations and presented to urgent care before being referred to the ER.  She has been out of methimazole for 2.5 months due to a lapse in insurance coverage. She feels light headed, palpitations, warm, and clamminess of the skin. She denies any fevers, chills, pain, headache, syncope, falls, or chest pain. She was prescribed propranolol for palpitations by her endocrinologist in [place] but it did not help this current presentation. She has insurance now.    Past Medical History  No past medical history on file.  No past surgical history on file.  albuterol (PROAIR HFA/PROVENTIL HFA/VENTOLIN HFA) 108 (90 Base) MCG/ACT inhaler  alendronate (FOSAMAX) 70 MG tablet  ALPRAZolam (XANAX) 0.25 MG tablet  cholecalciferol (D 5000) 125 MCG (5000 UT) CAPS  fluticasone-salmeterol (ADVAIR) 500-50 MCG/ACT inhaler  ibuprofen (ADVIL/MOTRIN) 600 MG tablet  ipratropium - albuterol 0.5 mg/2.5 mg/3 mL (DUONEB) 0.5-2.5 (3) MG/3ML neb solution  methimazole (TAPAZOLE) 10 MG tablet  omeprazole (PRILOSEC) 20 MG DR capsule  potassium chloride ER (K-TAB/KLOR-CON) 10 MEQ CR tablet      Allergies   Allergen Reactions    Hydrocodone-Acetaminophen Itching    Penicillins Hives and Nausea and Vomiting     CONV. REACTION:Hives, CONV. REACTION:Vomiting    Sulfa Antibiotics Hives and Nausea and Vomiting     CONV. REACTION:Hives, CONV. REACTION:Vomiting     Family History  No family history on file.  Social History   Social History     Tobacco Use    Smoking status: Never    Smokeless  "tobacco: Never   Vaping Use    Vaping status: Never Used      Past medical history, past surgical history, medications, allergies, family history, and social history were reviewed with the patient. No additional pertinent items.   A medically appropriate review of systems was performed with pertinent positives and negatives noted in the HPI, and all other systems negative.    Physical Exam   BP: 120/66  Pulse: 117  Temp: 98  F (36.7  C)  Resp: 17  Height: 160 cm (5' 3\")  Weight: 70.8 kg (156 lb)  SpO2: 99 %  Physical Exam  Constitutional:       General: She is not in acute distress.     Appearance: She is not ill-appearing.   HENT:      Head: Normocephalic and atraumatic.   Neck:      Comments: No thyromegaly  Cardiovascular:      Rate and Rhythm: Tachycardia present. Rhythm irregular.   Pulmonary:      Effort: Pulmonary effort is normal. No respiratory distress.      Breath sounds: Normal breath sounds. No stridor. No wheezing or rhonchi.   Abdominal:      General: Abdomen is flat. Bowel sounds are normal. There is no distension.      Palpations: Abdomen is soft.      Tenderness: There is abdominal tenderness. There is no guarding.   Musculoskeletal:      Cervical back: No tenderness.   Skin:     General: Skin is warm.      Findings: No bruising.      Comments: Skin is diffusely red and warm to touch   Neurological:      General: No focal deficit present.      Mental Status: She is alert.       ED Course, Procedures, & Data      Procedures            EKG Interpretation:      Interpreted by Saturnino Sandra MD  Time reviewed:   Symptoms at time of EK:47   Rhythm: atrial fibrillation  Rate: rapid ventricular rate (rate 150s)  Axis: normal  Ectopy: none  Conduction: normal  ST Segments/ T Waves: No ST-T wave changes  Q Waves: none  Comparison to prior: no prior to compare    Clinical Impression: atrial fibrillation with RVR         Results for orders placed or performed during the hospital encounter of " 09/16/24   TSH with free T4 reflex     Status: Abnormal   Result Value Ref Range    TSH <0.01 (L) 0.30 - 4.20 uIU/mL   Comprehensive metabolic panel     Status: Abnormal   Result Value Ref Range    Sodium 142 135 - 145 mmol/L    Potassium 4.4 3.4 - 5.3 mmol/L    Carbon Dioxide (CO2) 22 22 - 29 mmol/L    Anion Gap 12 7 - 15 mmol/L    Urea Nitrogen 19.5 8.0 - 23.0 mg/dL    Creatinine 0.67 0.51 - 0.95 mg/dL    GFR Estimate >90 >60 mL/min/1.73m2    Calcium 9.4 8.8 - 10.4 mg/dL    Chloride 108 (H) 98 - 107 mmol/L    Glucose 110 (H) 70 - 99 mg/dL    Alkaline Phosphatase 70 40 - 150 U/L    AST 20 0 - 45 U/L    ALT 14 0 - 50 U/L    Protein Total 6.9 6.4 - 8.3 g/dL    Albumin 4.2 3.5 - 5.2 g/dL    Bilirubin Total 0.4 <=1.2 mg/dL   Magnesium     Status: Normal   Result Value Ref Range    Magnesium 2.1 1.7 - 2.3 mg/dL   CBC with platelets and differential     Status: Abnormal   Result Value Ref Range    WBC Count 5.4 4.0 - 11.0 10e3/uL    RBC Count 4.74 3.80 - 5.20 10e6/uL    Hemoglobin 14.1 11.7 - 15.7 g/dL    Hematocrit 41.6 35.0 - 47.0 %    MCV 88 78 - 100 fL    MCH 29.7 26.5 - 33.0 pg    MCHC 33.9 31.5 - 36.5 g/dL    RDW 12.3 10.0 - 15.0 %    Platelet Count 223 150 - 450 10e3/uL    % Neutrophils 28 %    % Lymphocytes 55 %    % Monocytes 15 %    % Eosinophils 1 %    % Basophils 1 %    % Immature Granulocytes 0 %    NRBCs per 100 WBC 0 <1 /100    Absolute Neutrophils 1.5 (L) 1.6 - 8.3 10e3/uL    Absolute Lymphocytes 3.0 0.8 - 5.3 10e3/uL    Absolute Monocytes 0.8 0.0 - 1.3 10e3/uL    Absolute Eosinophils 0.0 0.0 - 0.7 10e3/uL    Absolute Basophils 0.0 0.0 - 0.2 10e3/uL    Absolute Immature Granulocytes 0.0 <=0.4 10e3/uL    Absolute NRBCs 0.0 10e3/uL   T4 free     Status: Abnormal   Result Value Ref Range    Free T4 5.20 (H) 0.90 - 1.70 ng/dL   EKG 12-lead, tracing only     Status: None   Result Value Ref Range    Systolic Blood Pressure  mmHg    Diastolic Blood Pressure  mmHg    Ventricular Rate 154 BPM    Atrial Rate  BPM     NJ Interval  ms    QRS Duration 72 ms     ms    QTc 454 ms    P Axis  degrees    R AXIS 48 degrees    T Axis 58 degrees    Interpretation ECG       Atrial fibrillation with rapid ventricular response  Abnormal ECG  Unconfirmed report - interpretation of this ECG is computer generated - see medical record for final interpretation  Confirmed by - EMERGENCY ROOM, PHYSICIAN ([id number]),  ADELA MENDEZ ([id number]) on 9/16/2024 9:24:07 PM     CBC with Platelets & Differential     Status: Abnormal    Narrative    The following orders were created for panel order CBC with Platelets & Differential.  Procedure                               Abnormality         Status                     ---------                               -----------         ------                     CBC with platelets and d...[[id number]]  Abnormal            Final result                 Please view results for these tests on the individual orders.     Medications   diltiazem (CARDIZEM) 125 mg in sodium chloride 0.9 % 125 mL infusion (10 mg/hr Intravenous Rate/Dose Change 9/16/24 0622)   lidocaine 1 % 0.1-1 mL (has no administration in time range)   lidocaine (LMX4) cream (has no administration in time range)   sodium chloride (PF) 0.9% PF flush 3 mL (3 mLs Intracatheter $Given 9/16/24 2917)   sodium chloride (PF) 0.9% PF flush 3 mL (has no administration in time range)   senna-docusate (SENOKOT-S/PERICOLACE) 8.6-50 MG per tablet 1 tablet (has no administration in time range)     Or   senna-docusate (SENOKOT-S/PERICOLACE) 8.6-50 MG per tablet 2 tablet (has no administration in time range)   calcium carbonate (TUMS) chewable tablet 1,000 mg (has no administration in time range)   albuterol (PROVENTIL HFA/VENTOLIN HFA) inhaler (has no administration in time range)   cholecalciferol (VITAMIN D3) capsule 5,000 Units (has no administration in time range)   methimazole (TAPAZOLE) tablet 10 mg (has no administration in time range)   pantoprazole  (PROTONIX) EC tablet 40 mg (has no administration in time range)   fluticasone-vilanterol (BREO ELLIPTA) 200-25 MCG/ACT inhaler 1 puff (has no administration in time range)   ipratropium - albuterol 0.5 mg/2.5 mg/3 mL (DUONEB) neb solution 3 mL (has no administration in time range)   sodium chloride 0.9% BOLUS 1,000 mL (0 mLs Intravenous Stopped 9/16/24 2040)   metoprolol (LOPRESSOR) injection 5 mg (5 mg Intravenous $Given 9/16/24 2007)   metoprolol tartrate (LOPRESSOR) tablet 25 mg (25 mg Oral $Given 9/16/24 2006)   metoprolol (LOPRESSOR) injection 5 mg (5 mg Intravenous $Given 9/16/24 2029)   acetaminophen (TYLENOL) tablet 1,000 mg (1,000 mg Oral $Given 9/16/24 2029)   methimazole (TAPAZOLE) tablet 10 mg (10 mg Oral $Given 9/16/24 2136)     Labs Ordered and Resulted from Time of ED Arrival to Time of ED Departure   TSH WITH FREE T4 REFLEX - Abnormal       Result Value    TSH <0.01 (*)    COMPREHENSIVE METABOLIC PANEL - Abnormal    Sodium 142      Potassium 4.4      Carbon Dioxide (CO2) 22      Anion Gap 12      Urea Nitrogen 19.5      Creatinine 0.67      GFR Estimate >90      Calcium 9.4      Chloride 108 (*)     Glucose 110 (*)     Alkaline Phosphatase 70      AST 20      ALT 14      Protein Total 6.9      Albumin 4.2      Bilirubin Total 0.4     CBC WITH PLATELETS AND DIFFERENTIAL - Abnormal    WBC Count 5.4      RBC Count 4.74      Hemoglobin 14.1      Hematocrit 41.6      MCV 88      MCH 29.7      MCHC 33.9      RDW 12.3      Platelet Count 223      % Neutrophils 28      % Lymphocytes 55      % Monocytes 15      % Eosinophils 1      % Basophils 1      % Immature Granulocytes 0      NRBCs per 100 WBC 0      Absolute Neutrophils 1.5 (*)     Absolute Lymphocytes 3.0      Absolute Monocytes 0.8      Absolute Eosinophils 0.0      Absolute Basophils 0.0      Absolute Immature Granulocytes 0.0      Absolute NRBCs 0.0     T4 FREE - Abnormal    Free T4 5.20 (*)    MAGNESIUM - Normal    Magnesium 2.1       No orders to  display          Critical care was not performed.     Medical Decision Making  The patient's presentation was of high complexity (an acute health issue posing potential threat to life or bodily function).    The patient's evaluation involved:  review of external note(s) from 1 sources (see separate area of note for details)  review of 3+ test result(s) ordered prior to this encounter (see separate area of note for details)  ordering and/or review of 3+ test(s) in this encounter (see separate area of note for details)  discussion of management or test interpretation with another health professional (hospitalist; endocrinology consulted)    The patient's management necessitated high risk (a decision regarding hospitalization).    Assessment & Plan    Monique Gupta is a 74 year old female with a history of a chronic obstructive asthma and graves' disease  who presents to the emergency department for tachycardia.     Patient's history of Graves' disease and presenting with tachycardia with palpitations suspicious for atrial fibrillation with RVR. Obtained TSH with T4. Obtained EKG which confirmed Afib with RVR and started cardiac monitoring. Administered IV and PO metoprolol for rate control with HR persistently to 160s. Started IV NS bolus and tylenol for lightheadedness. TSH less than 0.01 and free T4 elevated at 5.20. Gave another dose of IV and PO metoprolol with HR still in 140s. Given likely thyrotoxicosis contributing to Afib, restarted home dose of PO methimazole in the ED.    Given ongoing afib w/ RVR that did not improve in spite of IV and PO metoprolol, will plan for admission to medicine. After discussion with medicine, we initiated diltiazem infusion for help with rate control. Patient admitted to [facility] bed for further care.     Disposition  Consulted medicine for admission for thyrotoxicosis and Afib with RVR.    I have reviewed the nursing notes. I have reviewed the findings, diagnosis, plan and need  for follow up with the patient.    New Prescriptions    No medications on file       Final diagnoses:   Atrial fibrillation with RVR (H)   Hyperthyroidism       Vinay Min, MS4  [facility] EMERGENCY DEPARTMENT  9/16/2024    --    ED Attending Physician Attestation    I Saturnino Sandra MD, cared for this patient with the Medical Student. I performed, or re-performed, the physical exam and medical decision-making. I have verified the accuracy of all the medical student findings and documentation above, and have edited as necessary.    Summary of HPI, PE, ED Course   Patient is a 74 year old female evaluated in the emergency department for hyperthyroidism and afib w/ RVR. Exam and ED course notable for Afib w/ RVR, TSH <0.01 and elevated free T4. After the completion of care in the emergency department, the patient was admitted to inpatient.      Saturnino Sandra MD  Emergency Medicine        Saturnino Sandra MD  09/16/24 [id number]

## 2024-09-17 NOTE — PLAN OF CARE
Neuro: A&Ox4.   Cardiac: SR. Heart rate in 70s to 80s.Dailtiazam gtt was stopped at 1400 and Propranolol was given.  VSS.   Respiratory: Sating above 92% on RA.  GI/: Adequate urine output. BM X1  Diet/appetite: Tolerating regular diet. Eating well.  Activity:  SBA  Pain: At acceptable level on current regimen.   Skin: No new deficits noted.  LDA's: PIV, saline locked  Plan: Continue with POC. Notify primary team with changes.  Goal Outcome Evaluation:      Plan of Care Reviewed With: patient

## 2024-09-18 ENCOUNTER — TELEPHONE (OUTPATIENT)
Dept: OPHTHALMOLOGY | Facility: CLINIC | Age: 74
End: 2024-09-18
Payer: MEDICARE

## 2024-09-18 VITALS
OXYGEN SATURATION: 98 % | SYSTOLIC BLOOD PRESSURE: 121 MMHG | RESPIRATION RATE: 18 BRPM | BODY MASS INDEX: 27.64 KG/M2 | TEMPERATURE: 98.2 F | WEIGHT: 156 LBS | HEIGHT: 63 IN | DIASTOLIC BLOOD PRESSURE: 66 MMHG | HEART RATE: 81 BPM

## 2024-09-18 LAB
ANION GAP SERPL CALCULATED.3IONS-SCNC: 8 MMOL/L (ref 7–15)
BUN SERPL-MCNC: 16.1 MG/DL (ref 8–23)
CALCIUM SERPL-MCNC: 8.8 MG/DL (ref 8.8–10.4)
CHLORIDE SERPL-SCNC: 111 MMOL/L (ref 98–107)
CREAT SERPL-MCNC: 0.65 MG/DL (ref 0.51–0.95)
EGFRCR SERPLBLD CKD-EPI 2021: >90 ML/MIN/1.73M2
ERYTHROCYTE [DISTWIDTH] IN BLOOD BY AUTOMATED COUNT: 12.3 % (ref 10–15)
GLUCOSE SERPL-MCNC: 105 MG/DL (ref 70–99)
HCO3 SERPL-SCNC: 21 MMOL/L (ref 22–29)
HCT VFR BLD AUTO: 36.3 % (ref 35–47)
HGB BLD-MCNC: 12.1 G/DL (ref 11.7–15.7)
MCH RBC QN AUTO: 29.4 PG (ref 26.5–33)
MCHC RBC AUTO-ENTMCNC: 33.3 G/DL (ref 31.5–36.5)
MCV RBC AUTO: 88 FL (ref 78–100)
PLATELET # BLD AUTO: 180 10E3/UL (ref 150–450)
POTASSIUM SERPL-SCNC: 4.1 MMOL/L (ref 3.4–5.3)
RBC # BLD AUTO: 4.12 10E6/UL (ref 3.8–5.2)
SODIUM SERPL-SCNC: 140 MMOL/L (ref 135–145)
T3 SERPL-MCNC: 268 NG/DL (ref 85–202)
T4 FREE SERPL-MCNC: 4.5 NG/DL (ref 0.9–1.7)
WBC # BLD AUTO: 4.5 10E3/UL (ref 4–11)

## 2024-09-18 PROCEDURE — 80048 BASIC METABOLIC PNL TOTAL CA: CPT

## 2024-09-18 PROCEDURE — 250N000013 HC RX MED GY IP 250 OP 250 PS 637: Performed by: HOSPITALIST

## 2024-09-18 PROCEDURE — 250N000013 HC RX MED GY IP 250 OP 250 PS 637

## 2024-09-18 PROCEDURE — 250N000013 HC RX MED GY IP 250 OP 250 PS 637: Performed by: NURSE PRACTITIONER

## 2024-09-18 PROCEDURE — 99239 HOSP IP/OBS DSCHRG MGMT >30: CPT

## 2024-09-18 PROCEDURE — 84480 ASSAY TRIIODOTHYRONINE (T3): CPT

## 2024-09-18 PROCEDURE — 84439 ASSAY OF FREE THYROXINE: CPT

## 2024-09-18 PROCEDURE — 36415 COLL VENOUS BLD VENIPUNCTURE: CPT

## 2024-09-18 PROCEDURE — G0378 HOSPITAL OBSERVATION PER HR: HCPCS

## 2024-09-18 PROCEDURE — 85027 COMPLETE CBC AUTOMATED: CPT

## 2024-09-18 RX ORDER — FLUTICASONE PROPIONATE AND SALMETEROL 500; 50 UG/1; UG/1
1 POWDER RESPIRATORY (INHALATION) 2 TIMES DAILY
Qty: 60 EACH | Refills: 1 | Status: SHIPPED | OUTPATIENT
Start: 2024-09-18

## 2024-09-18 RX ORDER — METHIMAZOLE 10 MG/1
30 TABLET ORAL DAILY
Qty: 270 TABLET | Refills: 1 | Status: SHIPPED | OUTPATIENT
Start: 2024-09-18

## 2024-09-18 RX ORDER — ALBUTEROL SULFATE 90 UG/1
2 AEROSOL, METERED RESPIRATORY (INHALATION) EVERY 4 HOURS PRN
Qty: 18 G | Refills: 2 | Status: SHIPPED | OUTPATIENT
Start: 2024-09-18

## 2024-09-18 RX ORDER — PROPRANOLOL HCL 60 MG
60 CAPSULE, EXTENDED RELEASE 24HR ORAL DAILY
Qty: 90 CAPSULE | Refills: 0 | Status: SHIPPED | OUTPATIENT
Start: 2024-09-18

## 2024-09-18 RX ORDER — METHIMAZOLE 10 MG/1
20 TABLET ORAL DAILY
Qty: 90 TABLET | Refills: 1 | Status: SHIPPED | OUTPATIENT
Start: 2024-09-18 | End: 2024-09-18

## 2024-09-18 RX ORDER — GABAPENTIN 100 MG/1
100 CAPSULE ORAL 3 TIMES DAILY PRN
Qty: 60 CAPSULE | Refills: 0 | Status: SHIPPED | OUTPATIENT
Start: 2024-09-18

## 2024-09-18 RX ADMIN — PANTOPRAZOLE SODIUM 40 MG: 40 TABLET, DELAYED RELEASE ORAL at 06:45

## 2024-09-18 RX ADMIN — METHIMAZOLE 10 MG: 10 TABLET ORAL at 13:24

## 2024-09-18 RX ADMIN — METHIMAZOLE 10 MG: 10 TABLET ORAL at 08:53

## 2024-09-18 RX ADMIN — Medication 5000 UNITS: at 08:53

## 2024-09-18 RX ADMIN — PROPRANOLOL HYDROCHLORIDE 20 MG: 20 TABLET ORAL at 13:24

## 2024-09-18 RX ADMIN — PROPRANOLOL HYDROCHLORIDE 20 MG: 20 TABLET ORAL at 08:54

## 2024-09-18 RX ADMIN — ACETAMINOPHEN 650 MG: 325 TABLET ORAL at 13:29

## 2024-09-18 ASSESSMENT — ACTIVITIES OF DAILY LIVING (ADL)
ADLS_ACUITY_SCORE: 35

## 2024-09-18 NOTE — PROGRESS NOTES
PRIMARY DIAGNOSIS: A fib RVR, palpitations, lightheadedness  OUTPATIENT/OBSERVATION GOALS TO BE MET BEFORE DISCHARGE:  ADLs back to baseline: Yes    Activity and level of assistance: independent    Pain status: Improved-controlled with oral pain medications.    Return to near baseline physical activity: Yes     Discharge Planner Nurse   Safe discharge environment identified: Yes  Barriers to discharge: No       Entered by: Viji Pulido RN 09/18/2024 12:59 AM     -RVR resolved (HR sustained < 100) - Met   Visit Vitals  /52 (BP Location: Right arm, Patient Position: Supine, Cuff Size: Adult Regular)   Pulse 73   Temp 98.4  F (36.9  C) (Oral)   Resp 14

## 2024-09-18 NOTE — PROGRESS NOTES
[facility]    Medicine Progress Note - Hospitalist Service, GOLD TEAM 8    Date of Admission:  9/16/2024    Assessment & Plan      Monique Gupta is a 74 year old female w/ a significant PMHx of Graves' disease admitted with light-headedness, palpitations, and clamminess 2/2 afib RVR in the setting of not taking her methimazole for ~3 months due to insurance lapse.     Today  - endocrine consulted, appreciate recs  - continue methimazole 10mg TID  - stop dilt gtt, start propranolol 20mg TID    Atrial fibrillation w/ RVR 2/2 hyperthyroidism in the setting of Grave's disease and medication lapse  Patient with Grave's disease and methimazole lapse of >3 months presenting with tachycardia and palpitations suspicious for atrial fibrillation with RVR. EKG confirmed afib w/ RVR. TSH <0.01 and free T4 elevated at 5.20.  - endocrine consult, appreciate recs   - will need to establish outpatient follow-up with endocrine as well    - recommended to establish care in the ophthalmology clinic at the [facility]  - Cardiac monitoring  - rate converted to sinus on dilt gtt. Stop gtt today, start propranolol 20mg TID  - Restarted PTA methimazole 10 mg TID      Asthma, chronic  - PTA albuterol 2 puffs q4h PRN  - PTA fluticasone-salmeterol -> ordered alternative fluticasone-vilanterol  - PTA duoneb    Osteoporosis  - HOLD weekly alendronate  - PTA cholecalciferol 5000u    GERD  - PTA omeprazole 20 mg daily -> ordered alternative pantoprazole 40 mg daily    Anxiety  Usually has 0.25 mg Xanax PRN. Did not order on this admission, stated patient could ask if feeling like she needs it. Anxiety likely worsened and present due to hyperthyroidism       Observation Goals: RVR resolved (HR sustained < 100)  Diet: Combination Diet Regular Diet Adult    DVT Prophylaxis: Ambulate every shift  Monsalve Catheter: Not present  Lines: None     Cardiac Monitoring: ACTIVE order. Indication:  "Tachyarrhythmias, acute (48 hours)  Code Status: Full Code      Clinically Significant Risk Factors Present on Admission                          # Overweight: Estimated body mass index is 27.63 kg/m  as calculated from the following:    Height as of this encounter: 1.6 m (5' 3\").    Weight as of this encounter: 70.8 kg (156 lb).                    Disposition Plan     Medically Ready for Discharge: Anticipated Tomorrow             Nerissa Shen MD  Hospitalist Service, [address]  Securely message with [employer] (more info)  Text page via [facility] Paging/Directory   See signed in provider for up to date coverage information  ______________________________________________________________________    Interval History   Admitted overnight. Rhythm converted to NSR on diltiazem gtt    Patient reports that she has not had methimazole for the past few months. She has moved to [place] and has been caring for her partner who has lung cancer. Her insurance lapsed and she was unable to fill methimazole. She tried to establish care with endocrine as an outpatient but was told the earliest appointment was Feb 2025. She has had intermittent palpitations over the past few months as well though have not been as persistent as when she came to the hospital. She is feeling better today. Denies chest pain     Physical Exam   Vital Signs: Temp: 98.3  F (36.8  C) Temp src: Oral BP: 128/75 (MAP 88) Pulse: 74   Resp: 16 SpO2: 98 % O2 Device: None (Room air)    Weight: 156 lbs 0 oz    General: sitting up in bed, non-toxic appearing, no acute distress   HEENT: normocephalic, atraumatic with moist mucous membranes  Respiratory: comfortable conversational breathing on room air, no increased work of breathing. Clear to auscultation in all lobes bilaterally. No wheezes, rhonchi, rales, crackle  Cardiac: warm and well perfused extremities. Regular rate and rhythm. No murmurs, rubs, " gallops  Abdomen: Non-distended. Soft, non-tender to palpation  Extremities: No significant lower extremity edema     Medical Decision Making       55 MINUTES SPENT BY ME on the date of service doing chart review, history, exam, documentation & further activities per the note.      Data     I have personally reviewed the following data over the past 24 hrs:    4.2  \   13.0   / 178     141 111 (H) 17.9 /  102 (H)   4.0 21 (L) 0.62 \

## 2024-09-18 NOTE — PROGRESS NOTES
Pt has been denying any lightheadedness, palpitation, and clammy. HR has remained in the 70-80s. Tele running NSR, denied any pain, independent in room. Alert and oriented, able  to make needs known.

## 2024-09-18 NOTE — PROGRESS NOTES
PRIMARY DIAGNOSIS: A fib RVR, palpitations, lightheadedness  OUTPATIENT/OBSERVATION GOALS TO BE MET BEFORE DISCHARGE:  ADLs back to baseline: Yes     Activity and level of assistance: independent     Pain status: Improved-controlled with oral pain medications.     Return to near baseline physical activity: Yes             Discharge Planner Nurse  Safe discharge environment identified: Yes  Barriers to discharge: No       Entered by: Viji Pulido RN 09/18/2024 12:59 AM    Visit Vitals  /69 (BP Location: Right arm, Patient Position: Supine, Cuff Size: Adult Regular)   Pulse 75   Temp 98  F (36.7  C) (Oral)   Resp 14      -RVR resolved (HR sustained < 100) - Met

## 2024-09-18 NOTE — PROGRESS NOTES
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   Atrial fibrillation in the setting elevated thyroid levels   Graves' orbitopathy      Rosi Hall, diagnosed with Graves' disease in 2011, has a history of uncontrolled hyperthyroidism due to medication non-compliance. She has used methimazole in varying doses from 10 to 60 mg daily.  Over the years, the lab work has shown persistent biochemical hyperthyroidism. She discontinued the medication  2-3 months ago and her symptoms recurred, leading to a hyperthyroid state and atrial fibrillation.  The thyroid gland was slightly enlarged on the prior thyroid ultrasound from 2019.  Most recent thyrotropin receptor antibody strongly positive in March 2023.     We discussed with the patient about the long-term negative consequences of uncontrolled hyperthyroidism on the cardiac and bone health.  In view of her history, a more appropriate approach would be to either consider radioiodine treatment or thyroidectomy.  She does have mild evidence of proptosis and thyroidectomy might be a better option.  Discussed about the importance of controlling the thyroid hormone levels prior to undergoing surgery.  We also recommended to establish care in the ophthalmology clinic at the [facility].     Plan:   - Continue with Methimazole 10 mg three times a day  - Beta-blocker per cardiology   - Follow with endocrinology in 6 weeks  - Endocrinology will sign off     Elizabeth Matta MD   Endocrinology Fellow   Page # [phone]     Case was discussed and reviewed with Dr Waters      I, Talia Waters, was present with the fellow who participated in the service and in the documentation of the note.  I have verified the history and personally performed the physical exam and medical decision making.  I agree with the assessment and plan of care as documented in the note.     Talia Waters MD    20 minutes spent on the date of the encounter doing  chart review, history and exam, documentation and further activities as noted above.

## 2024-09-18 NOTE — TELEPHONE ENCOUNTER
Routine next available referral received:     Referral for: Graves Orbitopathy     Pt with hx of   Hyperthyroidism [E05.90]      Graves disease [E05.00]          Will route to Neuro-Ophthalmology team for review of referral.     Referral from inpatient team.     Shasta Manzo RN 2:54 PM 09/18/24

## 2024-09-18 NOTE — PLAN OF CARE
Goal Outcome Evaluation:       Pt has returned to baseline, Tele has been NSR, HR remained in the 70s, denied any pain, no dz or lightheadedness. Primary team put in order to discharge pt home with med and follow up with Endocrine. PIV removed. Discharge instruction given. Pt is accompanied home by significant other.

## 2024-09-18 NOTE — PROGRESS NOTES
"Observation goal:  -RVR resolved (HR sustained < 100) - met.     /75 (BP Location: Right arm)   Pulse 74   Temp 98.3  F (36.8  C) (Oral)   Resp 16   Ht 1.6 m (5' 3\")   Wt 70.8 kg (156 lb)   SpO2 98%   BMI 27.63 kg/m     "

## 2024-09-18 NOTE — DISCHARGE SUMMARY
"[facility]  Hospitalist Discharge Summary      Date of Admission:  9/16/2024  Date of Discharge:  9/18/2024  2:41 PM  Discharging Provider: Nerissa Shen MD  Discharge Service: Hospitalist Service, GOLD TEAM 8    Discharge Diagnoses   Grave's disease  A fib w/ RVR 2/2 hyperthyroidism   Asthma, crhonic  Osteoporosis  GERD  Anxiety  Sciatica     Clinically Significant Risk Factors     # Overweight: Estimated body mass index is 27.63 kg/m  as calculated from the following:    Height as of this encounter: 1.6 m (5' 3\").    Weight as of this encounter: 70.8 kg (156 lb).       Follow-ups Needed After Discharge   Follow-up Appointments     Adult [facility] Follow-up and recommended labs and tests      Follow-up with endocrinology clinic in 6 weeks.  Please also see ophthalmology for your eyes.     Appointments on [place] and/or [facility] (with [facility] or [facility]   provider or service). Call [phone] if you haven't heard regarding   these appointments within 7 days of discharge.            Unresulted Labs Ordered in the Past 30 Days of this Admission       No orders found from 8/17/2024 to 9/17/2024.        These results will be followed up by NA    Discharge Disposition   Discharged to home  Condition at discharge: Stable    Hospital Course     Monique Gupta is a 74 year old female w/ a significant PMHx of Graves' disease admitted with light-headedness, palpitations, and clamminess 2/2 afib RVR in the setting of not taking her methimazole for ~3 months due to insurance lapse.      Grave's disease   Atrial fibrillation w/ RVR 2/2 medication lapse   Patient with Grave's disease and methimazole lapse of >3 months presented to the hospital with tachycardia and palpitations. 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 Workup revealed atrial fibrillation with RVR. TSH <0.01 and free T4 elevated at 5.20. Diltiazem drip was started. Endocrinology consulted. She was restarted on home dose of methimazole 10mg TID. She converted to sinus rhythm on diltiazem gtt and was transitioned to propranolol 20mg TID. She stayed in sinus rhythm for >24 hours on PO propranolol and was transitioned to propranolol ER 60mg once daily for discharge. She was instructed to take methimazole 30mg daily by endocrinology for discharge with once daily dosing to help with compliance. Plan for endocrinology follow-up in 6 weeks. 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     Sciatica  Patient reported two years of ongoing sciatica symptoms. Trial of low dose gabapentin for pain at discharge. Referral for outpatient PT placed.      Asthma, chronic  - PTA albuterol 2 puffs q4h PRN  - PTA fluticasone-salmeterol   - PTA duoneb  Her asthma medications were refilled at discharge     Osteoporosis  - PTA cholecalciferol 5000u     GERD  - PTA omeprazole 20 mg daily -> ordered alternative pantoprazole 40 mg daily       Consultations This Hospital Stay   ENDOCRINE NON-DIABETES ADULT IP CONSULT    Code Status   Prior    Time Spent on this Encounter   I, Nerissa Shen MD, personally saw the patient today and spent greater than 30 minutes discharging this patient.       Nerissa Shen MD  [facility] EMERGENCY DEPARTMENT  [address] [address]  Phone: [phone]  ______________________________________________________________________    Physical Exam   Vital Signs: T98.2, HR 81, /66, RR 18, SpO2 98% on RA   Weight: 156 lbs 0 oz  General: sitting up in bed, non-toxic appearing, no acute distress   HEENT: normocephalic, atraumatic with moist mucous membranes  Respiratory:  comfortable conversational breathing on room air, no increased work of breathing. Clear to auscultation in all lobes bilaterally. No wheezes, rhonchi, rales, crackles  Cardiac: Regular rate and rhythm. No murmurs, rubs, gallops  Abdomen: Non-distended. Soft, non-tender to palpation  Extremities: Mild non-pitting lower extremity edema bilaterally       Primary Care Physician   Gabriella Cuenca    Discharge Orders      Adult Endocrinology  Referral      Adult Eye  Referral      Physical Therapy  Referral      Reason for your hospital stay    Dear Monique Gupta,    You were hospitalized at [facility] with Graves disease/hyperthyroidism. You were treated with methimazole and propranolol.  Over your hospitalization your condition greatly improved and today you are ready to be discharged home.  You should continue to take methimazole 20mg every day. If you develop fever, shortness of breath, light headedness, chest pain or other symptoms that concern you, please seek medical attention.    We are recommending the following medication changes:  1) Start methimazole 20mg once daily every day  2) Start propranolol (extended release) 60mg once daily every day    Additionally, I refilled your asthma inhalers. Please see your PCP for further refills.     You have also been prescribed gabapentin which you can take up to three times daily for pain related to sciatica. Please see your PCP for further refills.     Please follow-up with:  1) Endocrinology clinic in 6 weeks. You will be called to schedule this appointment.   2) Ophthalmology clinic for eye disease related to Grave's. You will be called to schedule this appointment.   3) A referral for outpatient PT has been placed for your sciatica    Thank you for allowing me and my team the privilege of caring for you. I wish you the best in your ongoing recovery.     Activity    Your activity upon discharge: activity as  tolerated     Adult [facility] Follow-up and recommended labs and tests    Follow-up with endocrinology clinic in 6 weeks.  Please also see ophthalmology for your eyes.     Appointments on [place] and/or [facility] (with [facility] or [facility] provider or service). Call [phone] if you haven't heard regarding these appointments within 7 days of discharge.     Diet    Follow this diet upon discharge: Current Diet:Orders Placed This Encounter      Combination Diet Regular Diet Adult       Significant Results and Procedures   Most Recent 3 CBC's:  Recent Labs   Lab Test 09/18/24 0628 09/17/24 0646 09/16/24 1942   WBC 4.5 4.2 5.4   HGB 12.1 13.0 14.1   MCV 88 88 88    178 223     Most Recent 3 BMP's:  Recent Labs   Lab Test 09/18/24 0628 09/17/24 0646 09/16/24 1942    141 142   POTASSIUM 4.1 4.0 4.4   CHLORIDE 111* 111* 108*   CO2 21* 21* 22   BUN 16.1 17.9 19.5   CR 0.65 0.62 0.67   ANIONGAP 8 9 12   JENNIFER 8.8 9.0 9.4   * 102* 110*     Most Recent 2 LFT's:  Recent Labs   Lab Test 09/16/24 1942   AST 20   ALT 14   ALKPHOS 70   BILITOTAL 0.4         Discharge Medications   Discharge Medication List as of 9/18/2024  2:19 PM        START taking these medications    Details   gabapentin (NEURONTIN) 100 MG capsule Take 1 capsule (100 mg) by mouth 3 times daily as needed for neuropathic pain., Disp-60 capsule, R-0, E-Prescribe      propranolol ER (INDERAL LA) 60 MG 24 hr capsule Take 1 capsule (60 mg) by mouth daily., Disp-90 capsule, R-0, E-Prescribe           CONTINUE these medications which have CHANGED    Details   albuterol (PROAIR HFA/PROVENTIL HFA/VENTOLIN HFA) 108 (90 Base) MCG/ACT inhaler Inhale 2 puffs into the lungs every 4 hours as needed for shortness of breath or wheezing., Disp-18 g, R-2, E-PrescribePharmacy may dispense brand covered by insurance (Proair, or proventil or ventolin or generic albuterol inhaler)      fluticasone-salmeterol (ADVAIR) 500-50 MCG/ACT inhaler Inhale 1 puff  into the lungs 2 times daily., Disp-60 each, R-1, E-Prescribe      methimazole (TAPAZOLE) 10 MG tablet Take 2 tablets (20 mg) by mouth daily., Disp-90 tablet, R-1, E-Prescribe           CONTINUE these medications which have NOT CHANGED    Details   ALPRAZolam (XANAX) 0.25 MG tablet Take 0.25 mg by mouth nightly as needed, Historical      ibuprofen (ADVIL/MOTRIN) 600 MG tablet Take 1 tablet (600 mg) by mouth every 8 hours as needed for moderate pain., Disp-90 tablet, R-0, E-Prescribe      ipratropium - albuterol 0.5 mg/2.5 mg/3 mL (DUONEB) 0.5-2.5 (3) MG/3ML neb solution Take 1 vial (3 mLs) by nebulization every 6 hours as needed for shortness of breath, wheezing or cough, Disp-90 mL, R-0, E-Prescribe      omeprazole (PRILOSEC) 20 MG DR capsule Take 1 capsule (20 mg) by mouth daily, Disp-90 capsule, R-0, E-Prescribe           STOP taking these medications       cholecalciferol (D 5000) 125 MCG (5000 UT) CAPS Comments:   Reason for Stopping:         propranolol (INDERAL) 10 MG tablet Comments:   Reason for Stopping:             Allergies   Allergies   Allergen Reactions    Hydrocodone-Acetaminophen Itching    Penicillins Hives and Nausea and Vomiting     CONV. REACTION:Hives, CONV. REACTION:Vomiting    Sulfa Antibiotics Hives and Nausea and Vomiting     CONV. REACTION:Hives, CONV. REACTION:Vomiting

## 2024-09-18 NOTE — TELEPHONE ENCOUNTER
OK for first available New Adult Eye with a general Ophthalmology provider, Dr. Dubose, Dr. Hazel, Dr. Nayan Stahl.     Referral for: Graves Orbitopathy     Referral by:  Nerissa Shen MD    Note to eye  to assist in scheduling.     OK to route back to triage if needing further assistance.     Shasta Manzo RN 3:15 PM 09/18/24

## 2024-09-18 NOTE — PLAN OF CARE
[id number]    Neuro: A&Ox4. Cooperative, call appropriately.  Cardiac: SR with HR 70s. VSS.    Respiratory: Sating >92% on RA.  GI/: Adequate urine output. No Bm this shift. LBM 9/17.  Diet/appetite: Tolerating regular diet. Eating well.   Activity:  Up independently, up in halls x3.  Pain: C/o headache pain rated 5 out of 10, managed with PRN tylenol & cold pack applied. Intervention effective per patient.   Skin: No new deficits noted.  LDA's: R PIV     Plan: Continue with POC. Notify primary team with changes.

## 2024-09-19 ENCOUNTER — PATIENT OUTREACH (OUTPATIENT)
Dept: CARE COORDINATION | Facility: CLINIC | Age: 74
End: 2024-09-19
Payer: MEDICARE

## 2024-09-19 NOTE — PROGRESS NOTES
"Clinic Care Coordination Contact  Transitions of Care Outreach  Chief Complaint   Patient presents with    Clinic Care Coordination - Post Hospital       Most Recent Admission Date: 9/16/2024   Most Recent Admission Diagnosis:      Most Recent Discharge Date: 9/18/2024   Most Recent Discharge Diagnosis: Atrial fibrillation with RVR (H) - I48.91  Hyperthyroidism - E05.90  Graves disease - E05.00  Chronic obstructive asthma (H) - J44.89  Hip pain, left - M25.552  Personal history of disease of respiratory system - Z87.09     Transitions of Care Assessment    Discharge Assessment  How are you doing now that you are home?: \" I'm doing okay \"  How are your symptoms? (Red Flag symptoms escalate to triage hotline per guidelines): Improved  Do you know how to contact your clinic care team if you have future questions or changes to your health status? : Yes  Does the patient have their discharge instructions? : Yes  Does the patient have questions regarding their discharge instructions? : No  Were you started on any new medications or were there changes to any of your previous medications? : Yes  Does the patient have all of their medications?: Yes  Do you have questions regarding any of your medications? : No  Do you have all of your needed medical supplies or equipment (DME)?  (i.e. oxygen tank, CPAP, cane, etc.): Yes    Post-op ([facility] Only)  If the patient had a surgery or procedure, do they have any questions for a nurse?: No             Follow up Plan     Discharge Follow-Up  Discharge follow up appointment scheduled in alignment with recommended follow up timeframe or Transitions of Risk Category? (Low = within 30 days; Moderate= within 14 days; High= within 7 days): No    Future Appointments   Date Time Provider Department Center   2/19/2025  2:00 PM Talia Waters MD [facility]       Outpatient Plan as outlined on AVS reviewed with patient.    For any urgent concerns, please contact our 24 hour " nurse triage line: [phone] ([phone])       Irina Mackenzie MA

## 2024-09-20 ENCOUNTER — TELEPHONE (OUTPATIENT)
Dept: ENDOCRINOLOGY | Facility: CLINIC | Age: 74
End: 2024-09-20
Payer: MEDICARE

## 2024-09-20 NOTE — TELEPHONE ENCOUNTER
Patient confirmed scheduled appointment:  Date: 10/21/24  Time: 11:30  Visit type: return endo  Provider: [facility]  Location: [facility]  Testing/imaging:   Additional notes: Spoke with patient. She prefers in person and I gave her the address to the [facility].     Lili Tubbs RN  [facility]  Pt has consult in place with Dr Waters. Ok to schedule as return, hospital follow up. Please help schedule within 2 weeks per hospital protocol with any provider who manages thyroid.  Thank you.  Lili Tubbs RN on 9/20/24 at 1:27 PM    Placed patient on wait list as no sooner in person visits available.     Cecy Dickey on 9/20/2024 at 5:23 PM

## 2024-10-18 ENCOUNTER — MYC REFILL (OUTPATIENT)
Dept: FAMILY MEDICINE | Facility: CLINIC | Age: 74
End: 2024-10-18
Payer: MEDICARE

## 2024-10-18 DIAGNOSIS — M25.562 CHRONIC PAIN OF LEFT KNEE: ICD-10-CM

## 2024-10-18 DIAGNOSIS — K44.9 HIATAL HERNIA: ICD-10-CM

## 2024-10-18 DIAGNOSIS — G89.29 CHRONIC PAIN OF LEFT KNEE: ICD-10-CM

## 2024-10-21 ENCOUNTER — OFFICE VISIT (OUTPATIENT)
Dept: ENDOCRINOLOGY | Facility: CLINIC | Age: 74
End: 2024-10-21
Payer: MEDICARE

## 2024-10-21 ENCOUNTER — LAB (OUTPATIENT)
Dept: LAB | Facility: CLINIC | Age: 74
End: 2024-10-21
Payer: MEDICARE

## 2024-10-21 VITALS
HEIGHT: 63 IN | HEART RATE: 57 BPM | OXYGEN SATURATION: 98 % | BODY MASS INDEX: 27.46 KG/M2 | DIASTOLIC BLOOD PRESSURE: 67 MMHG | WEIGHT: 155 LBS | SYSTOLIC BLOOD PRESSURE: 110 MMHG

## 2024-10-21 DIAGNOSIS — R13.10 DYSPHAGIA, UNSPECIFIED TYPE: ICD-10-CM

## 2024-10-21 DIAGNOSIS — M81.0 AGE-RELATED OSTEOPOROSIS WITHOUT CURRENT PATHOLOGICAL FRACTURE: Chronic | ICD-10-CM

## 2024-10-21 DIAGNOSIS — E05.00 GRAVES DISEASE: Chronic | ICD-10-CM

## 2024-10-21 DIAGNOSIS — R63.0 ANOREXIA: Primary | Chronic | ICD-10-CM

## 2024-10-21 LAB
ANION GAP SERPL CALCULATED.3IONS-SCNC: 9 MMOL/L (ref 7–15)
BUN SERPL-MCNC: 11.9 MG/DL (ref 8–23)
CALCIUM SERPL-MCNC: 9.7 MG/DL (ref 8.8–10.4)
CHLORIDE SERPL-SCNC: 108 MMOL/L (ref 98–107)
CREAT SERPL-MCNC: 0.79 MG/DL (ref 0.51–0.95)
EGFRCR SERPLBLD CKD-EPI 2021: 78 ML/MIN/1.73M2
GLUCOSE SERPL-MCNC: 100 MG/DL (ref 70–99)
HCO3 SERPL-SCNC: 25 MMOL/L (ref 22–29)
PHOSPHATE SERPL-MCNC: 3.3 MG/DL (ref 2.5–4.5)
POTASSIUM SERPL-SCNC: 4.7 MMOL/L (ref 3.4–5.3)
PTH-INTACT SERPL-MCNC: 40 PG/ML (ref 15–65)
SODIUM SERPL-SCNC: 142 MMOL/L (ref 135–145)
T3 SERPL-MCNC: 138 NG/DL (ref 85–202)
T4 FREE SERPL-MCNC: 1.51 NG/DL (ref 0.9–1.7)

## 2024-10-21 PROCEDURE — 83970 ASSAY OF PARATHORMONE: CPT | Performed by: PATHOLOGY

## 2024-10-21 PROCEDURE — 99000 SPECIMEN HANDLING OFFICE-LAB: CPT | Performed by: PATHOLOGY

## 2024-10-21 PROCEDURE — 84100 ASSAY OF PHOSPHORUS: CPT | Performed by: PATHOLOGY

## 2024-10-21 PROCEDURE — 80048 BASIC METABOLIC PNL TOTAL CA: CPT | Performed by: PATHOLOGY

## 2024-10-21 PROCEDURE — 84480 ASSAY TRIIODOTHYRONINE (T3): CPT | Performed by: STUDENT IN AN ORGANIZED HEALTH CARE EDUCATION/TRAINING PROGRAM

## 2024-10-21 PROCEDURE — G2211 COMPLEX E/M VISIT ADD ON: HCPCS | Performed by: STUDENT IN AN ORGANIZED HEALTH CARE EDUCATION/TRAINING PROGRAM

## 2024-10-21 PROCEDURE — 84439 ASSAY OF FREE THYROXINE: CPT | Performed by: PATHOLOGY

## 2024-10-21 PROCEDURE — 99205 OFFICE O/P NEW HI 60 MIN: CPT | Performed by: STUDENT IN AN ORGANIZED HEALTH CARE EDUCATION/TRAINING PROGRAM

## 2024-10-21 PROCEDURE — 82306 VITAMIN D 25 HYDROXY: CPT | Performed by: STUDENT IN AN ORGANIZED HEALTH CARE EDUCATION/TRAINING PROGRAM

## 2024-10-21 PROCEDURE — 36415 COLL VENOUS BLD VENIPUNCTURE: CPT | Performed by: PATHOLOGY

## 2024-10-21 RX ORDER — VITAMIN B COMPLEX
1 TABLET ORAL DAILY
Qty: 90 TABLET | Refills: 3 | Status: SHIPPED | OUTPATIENT
Start: 2024-10-21

## 2024-10-21 ASSESSMENT — PAIN SCALES - GENERAL: PAINLEVEL: NO PAIN (0)

## 2024-10-21 NOTE — LETTER
10/21/2024       RE: Monique Gupta  [id number] [facility] [id number]     Dear Colleague,    Thank you for referring your patient, Monique Gupta, to the [facility] ENDOCRINOLOGY CLINIC [place] at [facility]. Please see a copy of my visit note below.    Endocrinology Clinic Visit 10/21/2024    NAME:  Monique Gupta  PCP:  Gabriella Cuenca  MRN:  [id number]  Reason for Consult: Graves' disease  Requesting Provider:  Gabriella Cuenca    Chief Complaint     Chief Complaint   Patient presents with     Thyroid Disease       History of Present Illness     Monique Gupta is a 74 year old female who is seen in clinic for hyperthyroidism due to Graves' disease.  She has background history of osteoporosis, COPD, fibromyalgia, myofascial pain syndrome Graves' disease and A-fib with RVR.  Today is her first visit to the endocrinology clinic for assessment for thyrotoxicosis due to Graves' disease.    She was admitted to the hospital on 9/16/2024 with lightheadedness and palpitation was found to have new onset of A-fib with RVR was found to have thyrotoxicosis due to nonadherence with the medications as she had lapse in her insurance.    She was diagnosed with Graves' disease in 2011 since the time of the diagnosis in [place] her thyrotoxicosis has been not controlled due to nonadherence to the medications.  For the last few years she has been prescribed different doses of methimazole ranging between 10-60 mg.  She stopped taking methimazole completely in 2021 however developed recurrence of thyrotoxicosis restarted back on methimazole by her previous endocrinologist Dr. Peres in [place].  During her last visit with endocrinology in [place] on: She was placed on methimazole 10 mg twice daily and propranolol 10 mg 4 times a day as needed.  At that time she had concerns about weight gain associated with methimazole use.  At that time they  discussed with her considering thyroidectomy given multiple episodes of recurrence and challenge of achieving a euthyroid status.    During the admission to the hospital:  9/16/2024: TSH<0.01, free T45.20  9/18/2024: Free T44.5 Total T3 elevated 268    Converted to sinus rhythm with diltiazem.  Discharge from the hospital on:  Propranolol ER 60 mg daily.  Methimazole 30 mg daily.    On assessment today:  She stated she takes methimazole 30 mg daily , good adherence, no SE well tolerated   Propranolol 60 mg daily , no dizziness or lightheadedness.   No recurrence of palpitation,   has tremor improved but still has it ,  still has heat intolerance, no weight loss, no ongoing diarrhea, has ongoing insomnia and increased anxiety due to battelle of her BF with lung cancer had admission to the ICU .  Eyes related symptoms: has bilateral itchiness and FB sensation , no redness , has some puffiness , no blurring of vision , has   She has ongoing dysphagia for years no recent worsening no dyspnea no dysphonia no neck tightness.    Recent TFTs in [place]:  TSH <0.008 (L) 02/28/2023 09:33 AM  TSH 9.072 (H) 10/14/2022 08:55 AM  TSH <0.008 (L) 06/24/2022 09:13 AM  FREET4 2.82 (H) 02/28/2023 09:33 AM  FREET4 0.58 (L) 10/14/2022 08:55 AM  FREET4 1.70 (H) 06/24/2022 09:13 AM   TRAB 23.20 (H) 02/28/2023 09:33 AM     US thyroid on Sept 20, 2019. Enlarged.   Right Lobe: 5.7 x 2.2 x 2.3 cm. Diffuse heterogeneity and prominent blood flow without discrete nodule. Left Lobe: 5.7 x 1.8 x 2.5 cm. Diffuse heterogeneity and prominent blood flow without discrete nodule. Thyroid isthmus measures 5 mm.     History of osteoporosis:  DEXA bone scan on 3/2/2023:  Osteoporosis left forearm T-score -3.6.  Osteopenia right femoral neck T-score -1.1, osteopenia left total hip T-score -1.2.  Normal BMD lumbar spine.  History of fractures: no   Vitamin D supplements: no   Calcium intake: no milk , no yogurt , no ice-cream , eats some cheese  Parental  history of osteoporosis: Mother at age of 90 years had hip fracture   Steroid use: no   History of gastric bypass/malabsorption: no   History of rheumatoid arthritis: no   Alcohol: intake: none  Smoking : no  LMP:  at age 57 years old  GERD: yes she takes Omperazole   Last dental visit: was one month ago no planned procedure .          Problem List     Patient Active Problem List   Diagnosis     Age-related osteoporosis without current pathological fracture     Anorexia     Arthralgia of multiple sites     Chronic obstructive asthma (H)     Chronic tension-type headache     Dyslipidemia     Eating disorder     Fibromyalgia     Graves disease     History of hepatitis C     Myofascial pain syndrome     Vitamin D deficiency     Chronic left-sided low back pain with left-sided sciatica     Hip pain, left     Hyperthyroidism     Atrial fibrillation with RVR (H)        Medications     Current Outpatient Medications   Medication Sig Dispense Refill     albuterol (PROAIR HFA/PROVENTIL HFA/VENTOLIN HFA) 108 (90 Base) MCG/ACT inhaler Inhale 2 puffs into the lungs every 4 hours as needed for shortness of breath or wheezing. 18 g 2     ALPRAZolam (XANAX) 0.25 MG tablet Take 0.25 mg by mouth nightly as needed       calcium carbonate (OS-JENNIFER) 1500 (600 Ca) MG tablet Take 1 tablet (600 mg) by mouth 2 times daily (with meals). 180 tablet 3     fluticasone-salmeterol (ADVAIR) 500-50 MCG/ACT inhaler Inhale 1 puff into the lungs 2 times daily. 60 each 1     gabapentin (NEURONTIN) 100 MG capsule Take 1 capsule (100 mg) by mouth 3 times daily as needed for neuropathic pain. 60 capsule 0     ibuprofen (ADVIL/MOTRIN) 600 MG tablet Take 1 tablet (600 mg) by mouth every 8 hours as needed for moderate pain. 90 tablet 0     ipratropium - albuterol 0.5 mg/2.5 mg/3 mL (DUONEB) 0.5-2.5 (3) MG/3ML neb solution Take 1 vial (3 mLs) by nebulization every 6 hours as needed for shortness of breath, wheezing or cough 90 mL 0     methimazole (TAPAZOLE)  10 MG tablet Take 3 tablets (30 mg) by mouth daily. 270 tablet 1     omeprazole (PRILOSEC) 20 MG DR capsule Take 1 capsule (20 mg) by mouth daily 90 capsule 0     propranolol ER (INDERAL LA) 60 MG 24 hr capsule Take 1 capsule (60 mg) by mouth daily. 90 capsule 0     Vitamin D3 (CHOLECALCIFEROL) 25 mcg (1000 units) tablet Take 1 tablet (25 mcg) by mouth daily. 90 tablet 3     No current facility-administered medications for this visit.        Allergies     Allergies   Allergen Reactions     Hydrocodone-Acetaminophen Itching     Penicillins Hives and Nausea and Vomiting     CONV. REACTION:Hives, CONV. REACTION:Vomiting     Sulfa Antibiotics Hives and Nausea and Vomiting     CONV. REACTION:Hives, CONV. REACTION:Vomiting       Medical / Surgical History     No past medical history on file.  No past surgical history on file.    Social History     Social History     Socioeconomic History     Marital status:      Spouse name: Not on file     Number of children: Not on file     Years of education: Not on file     Highest education level: Not on file   Occupational History     Not on file   Tobacco Use     Smoking status: Never     Smokeless tobacco: Never   Vaping Use     Vaping status: Never Used   Substance and Sexual Activity     Alcohol use: Not on file     Drug use: Not on file     Sexual activity: Not on file   Other Topics Concern     Not on file   Social History Narrative     Not on file     Social Determinants of Health     Financial Resource Strain: Low Risk  (5/23/2024)    Financial Resource Strain      Within the past 12 months, have you or your family members you live with been unable to get utilities (heat, electricity) when it was really needed?: No   Food Insecurity: Low Risk  (5/23/2024)    Food Insecurity      Within the past 12 months, did you worry that your food would run out before you got money to buy more?: No      Within the past 12 months, did the food you bought just not last and you  "didn t have money to get more?: No   Transportation Needs: Low Risk  (5/23/2024)    Transportation Needs      Within the past 12 months, has lack of transportation kept you from medical appointments, getting your medicines, non-medical meetings or appointments, work, or from getting things that you need?: No   Physical Activity: Not on file   Stress: Not on file   Social Connections: Unknown (2/21/2024)    Received from [facility]    Social Connections      In the past 3 months, do you feel that you lack companionship or social support?: Not on file   Interpersonal Safety: Low Risk  (5/23/2024)    Interpersonal Safety      Do you feel physically and emotionally safe where you currently live?: Yes      Within the past 12 months, have you been hit, slapped, kicked or otherwise physically hurt by someone?: No      Within the past 12 months, have you been humiliated or emotionally abused in other ways by your partner or ex-partner?: No   Housing Stability: Low Risk  (5/23/2024)    Housing Stability      Do you have housing? : Yes      Are you worried about losing your housing?: No       Family History     No family history on file.    ROS     12 ROS completed, pertinent positive and negative in HPI    Physical Exam   /67   Pulse 57   Ht 1.6 m (5' 3\")   Wt 70.3 kg (155 lb)   SpO2 98%   BMI 27.46 kg/m       General: Comfortable, no obvious distress, normal body habitus  Eyes: No obvious proptosis no conjunctival injection no periorbital swelling no lid lag or lid retraction.  No ophthalmoplegia.  HENT: Atraumatic, no thyromegaly.  CV: normal rate.   Resp:  good effort, no evidence of loud wheezing   Skin: No rashes, lesions, or subcutaneous nodules on exposed skin.   Psych: Alert and oriented x 3. Appropriate affect, good insight  Musculoskeletal: Appropriate muscle bulk   Neuro: Moves all four extremities. No focal deficits on limited exam.  Bilateral tremor while stretching fingers.  Normal " "reflexes.    Labs/Imaging     Pertinent Labs were reviewed and discussed briefly.  Radiology Results were  reviewed and discussed briefly.    Summary of recent findings:   No results found for: \"A1C\"    TSH   Date Value Ref Range Status   09/16/2024 <0.01 (L) 0.30 - 4.20 uIU/mL Final     Free T4   Date Value Ref Range Status   09/18/2024 4.50 (H) 0.90 - 1.70 ng/dL Final   09/16/2024 5.20 (H) 0.90 - 1.70 ng/dL Final       Creatinine   Date Value Ref Range Status   09/18/2024 0.65 0.51 - 0.95 mg/dL Final        Latest Reference Range & Units 09/16/24 19:42   Alkaline Phosphatase 40 - 150 U/L 70   ALT 0 - 50 U/L 14   AST 0 - 45 U/L 20   Bilirubin Total <=1.2 mg/dL 0.4        Latest Reference Range & Units 09/18/24 06:28   WBC 4.0 - 11.0 10e3/uL 4.5   Hemoglobin 11.7 - 15.7 g/dL 12.1   Hematocrit 35.0 - 47.0 % 36.3   Platelet Count 150 - 450 10e3/uL 180   RBC Count 3.80 - 5.20 10e6/uL 4.12   MCV 78 - 100 fL 88   MCH 26.5 - 33.0 pg 29.4   MCHC 31.5 - 36.5 g/dL 33.3   RDW 10.0 - 15.0 % 12.3      Latest Ref Rng 9/16/2024  7:42 PM 9/17/2024  6:46 AM 9/18/2024  6:28 AM   ENDO CALCIUM LABS-UMP       Albumin 3.5 - 5.2 g/dL 4.2      Alkaline Phosphatase 40 - 150 U/L 70      Calcium 8.8 - 10.4 mg/dL 9.4  9.0  8.8    Urea Nitrogen 8.0 - 23.0 mg/dL 19.5  17.9  16.1    Creatinine 0.51 - 0.95 mg/dL 0.67  0.62  0.65    Magnesium 1.7 - 2.3 mg/dL 2.1          I personally reEXAMINATION:  DEXA BONE DENSITY SPINE AND HIP     DATE/TIME:  3/2/2023 10:35 AM     HISTORY:  Hyperthyroidism.     COMPARISON:  January 15, 2020.     TECHNIQUE:  Dual-energy X-ray absorptiometry (DXA) was performed on a Holo[employer] Interactive central device.  Bone mineral density (BMD) measurements were obtained.  The following information on each individual patient can be found on the [facility] Physician Portal or on the [facility] PACS website ([email or web address]).       SPINE ANALYSIS (L1-L4):     Average lumbar BMD (g/cm2):  1.112   T-score:  0.6   Z-score:  2.9     CHANGE " SINCE PRIOR SPINE EXAMINATION:  No comparison studies are available.     LEFT HIP ANALYSIS:    Left total hip BMD (g/cm2):  0.792   T-score:  -1.2   Z-score:  0.4     CHANGE SINCE PRIOR LEFT HIP EXAMINATION:  Interval increase in bone mineral density of 4%, statistically significant     RIGHT HIP ANALYSIS:    Right femoral neck BMD (g/cm2):  0.730   T-score:  -1.1   Z-score:  0.9     CHANGE SINCE PRIOR RIGHT HIP EXAMINATION:  No comparison studies are available.     LEFT FOREARM (1/3 RADIUS) ANALYSIS:   Left forearm BMD (g/cm2):  0.480   T-score:  -3.6   Z-score:  -1.2     ADDITIONAL FINDINGS:  No significant additional findings.      Note:  The International Society for Clinical Densitometry (ISCD) Official Positions state that osteoporosis in perimenopausal and postmenopausal women and in men age 50 and older may be diagnosed if the T-score of the lumbar spine, total hip, or femoral neck is -2.5 or less.  Hip classification is based on the reported BMD measurements of the femoral neck and total hip whichever is lower.  If bilateral hip measurements are obtained, Official Positions state that there is insufficient evidence to recommend mean readings.      WHO CLASSIFICATION: The T-score compares the patient's BMD to the average BMD of a young adult.  The criteria below are from the World Health Organization:   Normal: T-score -1.0 or above   Osteopenia/low bone mass: T-score -1.1 to -2.4   Osteoporosis: T-score -2.5 or lower   Severe or established osteoporosis: T-score -2.5 or lower plus fragility fracture viewed the patient's outside records from [facility] Everywhere. Summary of pertinent findings in HPI.    Impression / Plan     1.  Thyrotoxicosis due to Graves' disease:  History of poorly controlled thyrotoxicosis due to nonadherence to methimazole since 2011.  Recent admission to the hospital with A-fib due to thyrotoxicosis.  Discharged from the hospital on methimazole 30 mg/propranolol ER 60 mg daily  on 9/18.  Well-tolerated no side effects.  Stated today she has good adherence to the medication since the time of the discharge from the hospital.  Clinically:  most of the symptoms  related to thyrotoxicosis resolved.  Has some symptoms suggestive of thyroid eyes disease was referred to ophthalmology has upcoming appointment.    We discussed today the different options of treating Graves' disease including continuing with antithyroid medications, considering iodine  ablation (if cleared by ophthalmology), total thyroidectomy.  She stated she wants to continue with antithyroid medications.        Plan:  -Total T3/free T4 today with adjusting of the dose of methimazole if needed.  -To attend the appointment with ophthalmology on 11/12/2024.  -Follow-up in 3 months with TFTs prior to the visit.    2.  Osteoporosis:  Localized osteoporosis in the left forearm with osteopenia in bilateral hips consistent with age-related bone loss that worsened by thyrotoxicosis.  No history of fragility fracture.  Risk factors include: Untreated thyrotoxicosis for years, family history of osteoporosis, low calcium and vitamin D intake.    I discussed with her today regarding considering starting treatment for osteoporosis to reduce the risk of getting fragility fracture in the future.  She has history of GERD was on omeprazole in the past also was complaining of dysphagia today..  With controlling thyrotoxicosis I am anticipating her PMD will improve however given the severity of the osteoporosis initiating osteoporosis specific treatment is indicated.  We discussed today about considering starting yearly Reclast injection.  She stated she will think about it and  will finalize her decision by the next visit.      Plan:  -Labs today vitamin D/BMP/PTH/phosphorus  -To start vitamin D 1000 units daily if needs higher dose will let her know after getting the lab results.  -To start calcium carbonate elemental 600 mg twice  daily.  -Weightbearing exercises.  -Will rediscuss considering starting Reclast in the next visit.    3.  Dysphagia:  Ongoing history of dysphagia to solid food with no recent change.  Was never assessed before.    Plan:  -SLP referral for barium swallow.        Test and/or medications prescribed today:  Orders Placed This Encounter   Procedures     XR Video Swallow with SLP or OT - Order with Speech Therapy Referral     25 Hydroxyvitamin D2 and D3     Basic metabolic panel     Parathyroid Hormone Intact     Phosphorus     T3 total     T4 free     Speech Therapy  Referral         Follow up: 3 months      CARLOS Spears  Endocrinology, Diabetes and Metabolism  [facility]    74 minutes spent on the date of the encounter doing chart review, history and exam, documentation and further activities per the note  The longitudinal plan of care for the diagnosis(es)/condition(s) as documented were addressed during this visit. Due to the added complexity in care, I will continue to support Monique in the subsequent management and with ongoing continuity of care.    Note: Chart documentation done in part with Dragon Voice Recognition software. Although reviewed after completion, some word and grammatical errors may remain.  Please consider this when interpreting information in this chart        Again, thank you for allowing me to participate in the care of your patient.      Sincerely,    CARLOS Spears

## 2024-10-21 NOTE — PROGRESS NOTES
Endocrinology Clinic Visit 10/21/2024    NAME:  Monique Gupta  PCP:  Gabriella Cuenca  MRN:  [id number]  Reason for Consult: Graves' disease  Requesting Provider:  Gabriella Cuenca    Chief Complaint     Chief Complaint   Patient presents with    Thyroid Disease       History of Present Illness     Monique Gupta is a 74 year old female who is seen in clinic for hyperthyroidism due to Graves' disease.  She has background history of osteoporosis, COPD, fibromyalgia, myofascial pain syndrome Graves' disease and A-fib with RVR.  Today is her first visit to the endocrinology clinic for assessment for thyrotoxicosis due to Graves' disease.    She was admitted to the hospital on 9/16/2024 with lightheadedness and palpitation was found to have new onset of A-fib with RVR was found to have thyrotoxicosis due to nonadherence with the medications as she had lapse in her insurance.    She was diagnosed with Graves' disease in 2011 since the time of the diagnosis in [place] her thyrotoxicosis has been not controlled due to nonadherence to the medications.  For the last few years she has been prescribed different doses of methimazole ranging between 10-60 mg.  She stopped taking methimazole completely in 2021 however developed recurrence of thyrotoxicosis restarted back on methimazole by her previous endocrinologist Dr. Peres in [place].  During her last visit with endocrinology in [place] on: She was placed on methimazole 10 mg twice daily and propranolol 10 mg 4 times a day as needed.  At that time she had concerns about weight gain associated with methimazole use.  At that time they discussed with her considering thyroidectomy given multiple episodes of recurrence and challenge of achieving a euthyroid status.    During the admission to the hospital:  9/16/2024: TSH<0.01, free T45.20  9/18/2024: Free T44.5 Total T3 elevated 268    Converted to sinus rhythm with diltiazem.  Discharge from the hospital  on:  Propranolol ER 60 mg daily.  Methimazole 30 mg daily.    On assessment today:  She stated she takes methimazole 30 mg daily , good adherence, no SE well tolerated   Propranolol 60 mg daily , no dizziness or lightheadedness.   No recurrence of palpitation,   has tremor improved but still has it ,  still has heat intolerance, no weight loss, no ongoing diarrhea, has ongoing insomnia and increased anxiety due to battelle of her BF with lung cancer had admission to the ICU .  Eyes related symptoms: has bilateral itchiness and FB sensation , no redness , has some puffiness , no blurring of vision , has   She has ongoing dysphagia for years no recent worsening no dyspnea no dysphonia no neck tightness.    Recent TFTs in [place]:  TSH <0.008 (L) 02/28/2023 09:33 AM  TSH 9.072 (H) 10/14/2022 08:55 AM  TSH <0.008 (L) 06/24/2022 09:13 AM  FREET4 2.82 (H) 02/28/2023 09:33 AM  FREET4 0.58 (L) 10/14/2022 08:55 AM  FREET4 1.70 (H) 06/24/2022 09:13 AM   TRAB 23.20 (H) 02/28/2023 09:33 AM     US thyroid on Sept 20, 2019. Enlarged.   Right Lobe: 5.7 x 2.2 x 2.3 cm. Diffuse heterogeneity and prominent blood flow without discrete nodule. Left Lobe: 5.7 x 1.8 x 2.5 cm. Diffuse heterogeneity and prominent blood flow without discrete nodule. Thyroid isthmus measures 5 mm.     History of osteoporosis:  DEXA bone scan on 3/2/2023:  Osteoporosis left forearm T-score -3.6.  Osteopenia right femoral neck T-score -1.1, osteopenia left total hip T-score -1.2.  Normal BMD lumbar spine.  History of fractures: no   Vitamin D supplements: no   Calcium intake: no milk , no yogurt , no ice-cream , eats some cheese  Parental history of osteoporosis: Mother at age of 90 years had hip fracture   Steroid use: no   History of gastric bypass/malabsorption: no   History of rheumatoid arthritis: no   Alcohol: intake: none  Smoking : no  LMP:  at age 57 years old  GERD: yes she takes Omperazole   Last dental visit: was one month ago no planned  procedure .          Problem List     Patient Active Problem List   Diagnosis    Age-related osteoporosis without current pathological fracture    Anorexia    Arthralgia of multiple sites    Chronic obstructive asthma (H)    Chronic tension-type headache    Dyslipidemia    Eating disorder    Fibromyalgia    Graves disease    History of hepatitis C    Myofascial pain syndrome    Vitamin D deficiency    Chronic left-sided low back pain with left-sided sciatica    Hip pain, left    Hyperthyroidism    Atrial fibrillation with RVR (H)        Medications     Current Outpatient Medications   Medication Sig Dispense Refill    albuterol (PROAIR HFA/PROVENTIL HFA/VENTOLIN HFA) 108 (90 Base) MCG/ACT inhaler Inhale 2 puffs into the lungs every 4 hours as needed for shortness of breath or wheezing. 18 g 2    ALPRAZolam (XANAX) 0.25 MG tablet Take 0.25 mg by mouth nightly as needed      calcium carbonate (OS-JENNIFER) 1500 (600 Ca) MG tablet Take 1 tablet (600 mg) by mouth 2 times daily (with meals). 180 tablet 3    fluticasone-salmeterol (ADVAIR) 500-50 MCG/ACT inhaler Inhale 1 puff into the lungs 2 times daily. 60 each 1    gabapentin (NEURONTIN) 100 MG capsule Take 1 capsule (100 mg) by mouth 3 times daily as needed for neuropathic pain. 60 capsule 0    ibuprofen (ADVIL/MOTRIN) 600 MG tablet Take 1 tablet (600 mg) by mouth every 8 hours as needed for moderate pain. 90 tablet 0    ipratropium - albuterol 0.5 mg/2.5 mg/3 mL (DUONEB) 0.5-2.5 (3) MG/3ML neb solution Take 1 vial (3 mLs) by nebulization every 6 hours as needed for shortness of breath, wheezing or cough 90 mL 0    methimazole (TAPAZOLE) 10 MG tablet Take 3 tablets (30 mg) by mouth daily. 270 tablet 1    omeprazole (PRILOSEC) 20 MG DR capsule Take 1 capsule (20 mg) by mouth daily 90 capsule 0    propranolol ER (INDERAL LA) 60 MG 24 hr capsule Take 1 capsule (60 mg) by mouth daily. 90 capsule 0    Vitamin D3 (CHOLECALCIFEROL) 25 mcg (1000 units) tablet Take 1 tablet (25  mcg) by mouth daily. 90 tablet 3     No current facility-administered medications for this visit.        Allergies     Allergies   Allergen Reactions    Hydrocodone-Acetaminophen Itching    Penicillins Hives and Nausea and Vomiting     CONV. REACTION:Hives, CONV. REACTION:Vomiting    Sulfa Antibiotics Hives and Nausea and Vomiting     CONV. REACTION:Hives, CONV. REACTION:Vomiting       Medical / Surgical History     No past medical history on file.  No past surgical history on file.    Social History     Social History     Socioeconomic History    Marital status:      Spouse name: Not on file    Number of children: Not on file    Years of education: Not on file    Highest education level: Not on file   Occupational History    Not on file   Tobacco Use    Smoking status: Never    Smokeless tobacco: Never   Vaping Use    Vaping status: Never Used   Substance and Sexual Activity    Alcohol use: Not on file    Drug use: Not on file    Sexual activity: Not on file   Other Topics Concern    Not on file   Social History Narrative    Not on file     Social Determinants of Health     Financial Resource Strain: Low Risk  (5/23/2024)    Financial Resource Strain     Within the past 12 months, have you or your family members you live with been unable to get utilities (heat, electricity) when it was really needed?: No   Food Insecurity: Low Risk  (5/23/2024)    Food Insecurity     Within the past 12 months, did you worry that your food would run out before you got money to buy more?: No     Within the past 12 months, did the food you bought just not last and you didn t have money to get more?: No   Transportation Needs: Low Risk  (5/23/2024)    Transportation Needs     Within the past 12 months, has lack of transportation kept you from medical appointments, getting your medicines, non-medical meetings or appointments, work, or from getting things that you need?: No   Physical Activity: Not on file   Stress: Not on file  "  Social Connections: Unknown (2/21/2024)    Received from [facility]    Social Connections     In the past 3 months, do you feel that you lack companionship or social support?: Not on file   Interpersonal Safety: Low Risk  (5/23/2024)    Interpersonal Safety     Do you feel physically and emotionally safe where you currently live?: Yes     Within the past 12 months, have you been hit, slapped, kicked or otherwise physically hurt by someone?: No     Within the past 12 months, have you been humiliated or emotionally abused in other ways by your partner or ex-partner?: No   Housing Stability: Low Risk  (5/23/2024)    Housing Stability     Do you have housing? : Yes     Are you worried about losing your housing?: No       Family History     No family history on file.    ROS     12 ROS completed, pertinent positive and negative in HPI    Physical Exam   /67   Pulse 57   Ht 1.6 m (5' 3\")   Wt 70.3 kg (155 lb)   SpO2 98%   BMI 27.46 kg/m       General: Comfortable, no obvious distress, normal body habitus  Eyes: No obvious proptosis no conjunctival injection no periorbital swelling no lid lag or lid retraction.  No ophthalmoplegia.  HENT: Atraumatic, no thyromegaly.  CV: normal rate.   Resp:  good effort, no evidence of loud wheezing   Skin: No rashes, lesions, or subcutaneous nodules on exposed skin.   Psych: Alert and oriented x 3. Appropriate affect, good insight  Musculoskeletal: Appropriate muscle bulk   Neuro: Moves all four extremities. No focal deficits on limited exam.  Bilateral tremor while stretching fingers.  Normal reflexes.    Labs/Imaging     Pertinent Labs were reviewed and discussed briefly.  Radiology Results were  reviewed and discussed briefly.    Summary of recent findings:   No results found for: \"A1C\"    TSH   Date Value Ref Range Status   09/16/2024 <0.01 (L) 0.30 - 4.20 uIU/mL Final     Free T4   Date Value Ref Range Status   09/18/2024 4.50 (H) 0.90 - 1.70 ng/dL Final   09/16/2024 " 5.20 (H) 0.90 - 1.70 ng/dL Final       Creatinine   Date Value Ref Range Status   09/18/2024 0.65 0.51 - 0.95 mg/dL Final        Latest Reference Range & Units 09/16/24 19:42   Alkaline Phosphatase 40 - 150 U/L 70   ALT 0 - 50 U/L 14   AST 0 - 45 U/L 20   Bilirubin Total <=1.2 mg/dL 0.4        Latest Reference Range & Units 09/18/24 06:28   WBC 4.0 - 11.0 10e3/uL 4.5   Hemoglobin 11.7 - 15.7 g/dL 12.1   Hematocrit 35.0 - 47.0 % 36.3   Platelet Count 150 - 450 10e3/uL 180   RBC Count 3.80 - 5.20 10e6/uL 4.12   MCV 78 - 100 fL 88   MCH 26.5 - 33.0 pg 29.4   MCHC 31.5 - 36.5 g/dL 33.3   RDW 10.0 - 15.0 % 12.3      Latest Ref Rng 9/16/2024  7:42 PM 9/17/2024  6:46 AM 9/18/2024  6:28 AM   ENDO CALCIUM LABS-[facility]       Albumin 3.5 - 5.2 g/dL 4.2      Alkaline Phosphatase 40 - 150 U/L 70      Calcium 8.8 - 10.4 mg/dL 9.4  9.0  8.8    Urea Nitrogen 8.0 - 23.0 mg/dL 19.5  17.9  16.1    Creatinine 0.51 - 0.95 mg/dL 0.67  0.62  0.65    Magnesium 1.7 - 2.3 mg/dL 2.1          I personally reEXAMINATION:  DEXA BONE DENSITY SPINE AND HIP     DATE/TIME:  3/2/2023 10:35 AM     HISTORY:  Hyperthyroidism.     COMPARISON:  January 15, 2020.     TECHNIQUE:  Dual-energy X-ray absorptiometry (DXA) was performed on a Holo[employer]ated [employer] (IMMI) central device.  Bone mineral density (BMD) measurements were obtained.  The following information on each individual patient can be found on the [facility] Physician Portal or on the [facility] PACS website ([email or web address]).       SPINE ANALYSIS (L1-L4):     Average lumbar BMD (g/cm2):  1.112   T-score:  0.6   Z-score:  2.9     CHANGE SINCE PRIOR SPINE EXAMINATION:  No comparison studies are available.     LEFT HIP ANALYSIS:    Left total hip BMD (g/cm2):  0.792   T-score:  -1.2   Z-score:  0.4     CHANGE SINCE PRIOR LEFT HIP EXAMINATION:  Interval increase in bone mineral density of 4%, statistically significant     RIGHT HIP ANALYSIS:    Right femoral neck BMD (g/cm2):  0.730   T-score:  -1.1   Z-score:  0.9      CHANGE SINCE PRIOR RIGHT HIP EXAMINATION:  No comparison studies are available.     LEFT FOREARM (1/3 RADIUS) ANALYSIS:   Left forearm BMD (g/cm2):  0.480   T-score:  -3.6   Z-score:  -1.2     ADDITIONAL FINDINGS:  No significant additional findings.      Note:  The International Society for Clinical Densitometry (ISCD) Official Positions state that osteoporosis in perimenopausal and postmenopausal women and in men age 50 and older may be diagnosed if the T-score of the lumbar spine, total hip, or femoral neck is -2.5 or less.  Hip classification is based on the reported BMD measurements of the femoral neck and total hip whichever is lower.  If bilateral hip measurements are obtained, Official Positions state that there is insufficient evidence to recommend mean readings.      WHO CLASSIFICATION: The T-score compares the patient's BMD to the average BMD of a young adult.  The criteria below are from the World Health Organization:   Normal: T-score -1.0 or above   Osteopenia/low bone mass: T-score -1.1 to -2.4   Osteoporosis: T-score -2.5 or lower   Severe or established osteoporosis: T-score -2.5 or lower plus fragility fracture viewed the patient's outside records from [facility] Everywhere. Summary of pertinent findings in HPI.    Impression / Plan     1.  Thyrotoxicosis due to Graves' disease:  History of poorly controlled thyrotoxicosis due to nonadherence to methimazole since 2011.  Recent admission to the hospital with A-fib due to thyrotoxicosis.  Discharged from the hospital on methimazole 30 mg/propranolol ER 60 mg daily on 9/18.  Well-tolerated no side effects.  Stated today she has good adherence to the medication since the time of the discharge from the hospital.  Clinically:  most of the symptoms  related to thyrotoxicosis resolved.  Has some symptoms suggestive of thyroid eyes disease was referred to ophthalmology has upcoming appointment.    We discussed today the different options of  treating Graves' disease including continuing with antithyroid medications, considering iodine  ablation (if cleared by ophthalmology), total thyroidectomy.  She stated she wants to continue with antithyroid medications.        Plan:  -Total T3/free T4 today with adjusting of the dose of methimazole if needed.  -To attend the appointment with ophthalmology on 11/12/2024.  -Follow-up in 3 months with TFTs prior to the visit.    2.  Osteoporosis:  Localized osteoporosis in the left forearm with osteopenia in bilateral hips consistent with age-related bone loss that worsened by thyrotoxicosis.  No history of fragility fracture.  Risk factors include: Untreated thyrotoxicosis for years, family history of osteoporosis, low calcium and vitamin D intake.    I discussed with her today regarding considering starting treatment for osteoporosis to reduce the risk of getting fragility fracture in the future.  She has history of GERD was on omeprazole in the past also was complaining of dysphagia today..  With controlling thyrotoxicosis I am anticipating her PMD will improve however given the severity of the osteoporosis initiating osteoporosis specific treatment is indicated.  We discussed today about considering starting yearly Reclast injection.  She stated she will think about it and  will finalize her decision by the next visit.      Plan:  -Labs today vitamin D/BMP/PTH/phosphorus  -To start vitamin D 1000 units daily if needs higher dose will let her know after getting the lab results.  -To start calcium carbonate elemental 600 mg twice daily.  -Weightbearing exercises.  -Will rediscuss considering starting Reclast in the next visit.    3.  Dysphagia:  Ongoing history of dysphagia to solid food with no recent change.  Was never assessed before.    Plan:  -SLP referral for barium swallow.        Test and/or medications prescribed today:  Orders Placed This Encounter   Procedures    XR Video Swallow with SLP or OT - Order  with Speech Therapy Referral    25 Hydroxyvitamin D2 and D3    Basic metabolic panel    Parathyroid Hormone Intact    Phosphorus    T3 total    T4 free    Speech Therapy  Referral         Follow up: 3 months      CARLOS Spears  Endocrinology, Diabetes and Metabolism  [facility]    74 minutes spent on the date of the encounter doing chart review, history and exam, documentation and further activities per the note  The longitudinal plan of care for the diagnosis(es)/condition(s) as documented were addressed during this visit. Due to the added complexity in care, I will continue to support Monique in the subsequent management and with ongoing continuity of care.    Note: Chart documentation done in part with Dragon Voice Recognition software. Although reviewed after completion, some word and grammatical errors may remain.  Please consider this when interpreting information in this chart

## 2024-10-21 NOTE — PATIENT INSTRUCTIONS
- Labs today   - To start to take vitamin D and calcium   - Labs in one month   - Labs in 3 months   - To think about starting Reclast for osteoporosis   - To attend the appointment with ophthalmology on 11/12/2024  - We will see you in 3 months

## 2024-10-22 RX ORDER — IBUPROFEN 600 MG/1
600 TABLET, FILM COATED ORAL EVERY 8 HOURS PRN
Qty: 90 TABLET | Refills: 0 | Status: SHIPPED | OUTPATIENT
Start: 2024-10-22

## 2024-10-22 RX ORDER — ALPRAZOLAM 0.25 MG/1
0.25 TABLET ORAL
OUTPATIENT
Start: 2024-10-22

## 2024-10-22 NOTE — TELEPHONE ENCOUNTER
Refilled Ibuprofen and Advil. I will not be refilling Xanax, this was not prescribed by me. Thank you, Gabriella Cuenca MD MPH

## 2024-10-23 LAB
DEPRECATED CALCIDIOL+CALCIFEROL SERPL-MC: <66 UG/L (ref 20–75)
VITAMIN D2 SERPL-MCNC: <5 UG/L
VITAMIN D3 SERPL-MCNC: 61 UG/L

## 2024-10-28 ASSESSMENT — ACTIVITIES OF DAILY LIVING (ADL)
WALKING_UP_STEEP_HILLS: MODERATE DIFFICULTY
HEAVY_WORK: MODERATE DIFFICULTY
HOW_WOULD_YOU_RATE_THE_OVERALL_FUNCTION_OF_YOUR_KNEE_DURING_YOUR_USUAL_DAILY_ACTIVITIES?: ABNORMAL
SITTING FOR 15 MINUTES: SLIGHT DIFFICULTY
GOING UP 1 FLIGHT OF STAIRS: MODERATE DIFFICULTY
SIT WITH YOUR KNEE BENT: ACTIVITY IS FAIRLY DIFFICULT
HOS_ADL_SCORE(%): 60.29
WEAKNESS: THE SYMPTOM AFFECTS MY ACTIVITY MODERATELY
PUTTING ON SOCKS AND SHOES: SLIGHT DIFFICULTY
STIFFNESS: THE SYMPTOM AFFECTS MY ACTIVITY MODERATELY
HOS_ADL_HIGHEST_POTENTIAL_SCORE: 68
KNEE_ACTIVITY_OF_DAILY_LIVING_SCORE: 44.61
SWELLING: THE SYMPTOM AFFECTS MY ACTIVITY SLIGHTLY
ROLLING OVER IN BED: SLIGHT DIFFICULTY
WALKING_APPROXIMATELY_10_MINUTES: SLIGHT DIFFICULTY
RAW_SCORE: 31.23
GETTING_INTO_AND_OUT_OF_A_BATHTUB: SLIGHT DIFFICULTY
WALKING_15_MINUTES_OR_GREATER: MODERATE DIFFICULTY
WALK: ACTIVITY IS FAIRLY DIFFICULT
STANDING FOR 15 MINUTES: MODERATE DIFFICULTY
DEEP SQUATTING: EXTREME DIFFICULTY
HOS_ADL_ITEM_SCORE_TOTAL: 41
WALKING_INITIALLY: SLIGHT DIFFICULTY
GETTING INTO AND OUT OF AN AVERAGE CAR: MODERATE DIFFICULTY
LIMPING: THE SYMPTOM AFFECTS MY ACTIVITY MODERATELY
KNEEL ON THE FRONT OF YOUR KNEE: ACTIVITY IS SOMEWHAT DIFFICULT
STEPPING UP AND DOWN CURBS: SLIGHT DIFFICULTY
STAND: ACTIVITY IS SOMEWHAT DIFFICULT
GO UP STAIRS: ACTIVITY IS FAIRLY DIFFICULT
LIGHT_TO_MODERATE_WORK: SLIGHT DIFFICULTY
GOING DOWN 1 FLIGHT OF STAIRS: SLIGHT DIFFICULTY
GO DOWN STAIRS: ACTIVITY IS SOMEWHAT DIFFICULT
RISE FROM A CHAIR: ACTIVITY IS FAIRLY DIFFICULT
RECREATIONAL ACTIVITIES: MODERATE DIFFICULTY
AS_A_RESULT_OF_YOUR_KNEE_INJURY,_HOW_WOULD_YOU_RATE_YOUR_CURRENT_LEVEL_OF_DAILY_ACTIVITY?: ABNORMAL
HOW_WOULD_YOU_RATE_YOUR_CURRENT_LEVEL_OF_FUNCTION_DURING_YOUR_USUAL_ACTIVITIES_OF_DAILY_LIVING_FROM_0_TO_100_WITH_100_BEING_YOUR_LEVEL_OF_FUNCTION_PRIOR_TO_YOUR_HIP_PROBLEM_AND_0_BEING_THE_INABILITY_TO_PERFORM_ANY_OF_YOUR_USUAL_DAILY_ACTIVITIES?: 50
WALKING_DOWN_STEEP_HILLS: SLIGHT DIFFICULTY
PAIN: THE SYMPTOM AFFECTS MY ACTIVITY MODERATELY
TWISTING/PIVOTING ON INVOLVED LEG: MODERATE DIFFICULTY
HOW_WOULD_YOU_RATE_THE_CURRENT_FUNCTION_OF_YOUR_KNEE_DURING_YOUR_USUAL_DAILY_ACTIVITIES_ON_A_SCALE_FROM_0_TO_100_WITH_100_BEING_YOUR_LEVEL_OF_KNEE_FUNCTION_PRIOR_TO_YOUR_INJURY_AND_0_BEING_THE_INABILITY_TO_PERFORM_ANY_OF_YOUR_USUAL_DAILY_ACTIVITIES?: 50
KNEE_ACTIVITY_OF_DAILY_LIVING_SUM: 29
SQUAT: ACTIVITY IS VERY DIFFICULT

## 2024-10-31 DIAGNOSIS — M25.552 HIP PAIN, LEFT: ICD-10-CM

## 2024-10-31 RX ORDER — GABAPENTIN 100 MG/1
100 CAPSULE ORAL 3 TIMES DAILY PRN
Qty: 60 CAPSULE | Refills: 0 | Status: SHIPPED | OUTPATIENT
Start: 2024-10-31

## 2024-11-09 ASSESSMENT — ACTIVITIES OF DAILY LIVING (ADL)
GO UP STAIRS: ACTIVITY IS FAIRLY DIFFICULT
GOING_DOWN_1_FLIGHT_OF_STAIRS: SLIGHT DIFFICULTY
JUMPING: UNABLE TO DO
ROLLING OVER IN BED: MODERATE DIFFICULTY
GETTING_INTO_AND_OUT_OF_AN_AVERAGE_CAR: MODERATE DIFFICULTY
LIGHT_TO_MODERATE_WORK: MODERATE DIFFICULTY
HOW_WOULD_YOU_RATE_THE_OVERALL_FUNCTION_OF_YOUR_KNEE_DURING_YOUR_USUAL_DAILY_ACTIVITIES?: ABNORMAL
HOW_WOULD_YOU_RATE_THE_CURRENT_FUNCTION_OF_YOUR_KNEE_DURING_YOUR_USUAL_DAILY_ACTIVITIES_ON_A_SCALE_FROM_0_TO_100_WITH_100_BEING_YOUR_LEVEL_OF_KNEE_FUNCTION_PRIOR_TO_YOUR_INJURY_AND_0_BEING_THE_INABILITY_TO_PERFORM_ANY_OF_YOUR_USUAL_DAILY_ACTIVITIES?: 50
WALKING_DOWN_STEEP_HILLS: SLIGHT DIFFICULTY
WALKING_APPROXIMATELY_10_MINUTES: MODERATE DIFFICULTY
HOS_ADL_ITEM_SCORE_TOTAL: 33
LANDING: EXTREME DIFFICULTY
HOW_WOULD_YOU_RATE_YOUR_CURRENT_LEVEL_OF_FUNCTION_DURING_YOUR_SPORTS_RELATED_ACTIVITIES_FROM_0_TO_100_WITH_100_BEING_YOUR_LEVEL_OF_FUNCTION_PRIOR_TO_YOUR_HIP_PROBLEM_AND_0_BEING_THE_INABILITY_TO_PERFORM_ANY_OF_YOUR_USUAL_DAILY_ACTIVITIES?: 40
GO UP STAIRS: ACTIVITY IS FAIRLY DIFFICULT
HEAVY_WORK: EXTREME DIFFICULTY
STEPPING_UP_AND_DOWN_CURBS: MODERATE DIFFICULTY
KNEEL ON THE FRONT OF YOUR KNEE: ACTIVITY IS SOMEWHAT DIFFICULT
AS_A_RESULT_OF_YOUR_KNEE_INJURY,_HOW_WOULD_YOU_RATE_YOUR_CURRENT_LEVEL_OF_DAILY_ACTIVITY?: ABNORMAL
SWELLING: THE SYMPTOM AFFECTS MY ACTIVITY SLIGHTLY
GO DOWN STAIRS: ACTIVITY IS SOMEWHAT DIFFICULT
STIFFNESS: THE SYMPTOM AFFECTS MY ACTIVITY MODERATELY
GETTING_INTO_AND_OUT_OF_A_BATHTUB: SLIGHT DIFFICULTY
SWELLING: THE SYMPTOM AFFECTS MY ACTIVITY SLIGHTLY
PAIN: THE SYMPTOM AFFECTS MY ACTIVITY MODERATELY
LIMPING: THE SYMPTOM AFFECTS MY ACTIVITY MODERATELY
TWISTING/PIVOTING_ON_INVOLVED_LEG: EXTREME DIFFICULTY
LIMPING: THE SYMPTOM AFFECTS MY ACTIVITY MODERATELY
STANDING_FOR_15_MINUTES: MODERATE DIFFICULTY
HOW_WOULD_YOU_RATE_THE_OVERALL_FUNCTION_OF_YOUR_KNEE_DURING_YOUR_USUAL_DAILY_ACTIVITIES?: ABNORMAL
ADL_HIGHEST_POTENTIAL_SCORE: 68
GOING DOWN 1 FLIGHT OF STAIRS: SLIGHT DIFFICULTY
RISE FROM A CHAIR: ACTIVITY IS FAIRLY DIFFICULT
HEAVY_WORK: EXTREME DIFFICULTY
GETTING INTO AND OUT OF AN AVERAGE CAR: MODERATE DIFFICULTY
SPORTS_HIGHEST_POTENTIAL_SCORE: 36
SPORTS_TOTAL_ITEM_SCORE: 0
SITTING_FOR_15_MINUTES: MODERATE DIFFICULTY
PLEASE_INDICATE_YOR_PRIMARY_REASON_FOR_REFERRAL_TO_THERAPY:: KNEE
WALKING_FOR_APPROXIMATELY_10_MINUTES: MODERATE DIFFICULTY
PAIN: THE SYMPTOM AFFECTS MY ACTIVITY MODERATELY
SPORTS_SCORE(%): 0
SPORTS_COUNT: 9
RECREATIONAL_ACTIVITIES: MODERATE DIFFICULTY
ROLLING_OVER_IN_BED: MODERATE DIFFICULTY
STANDING FOR 15 MINUTES: MODERATE DIFFICULTY
HOW_WOULD_YOU_RATE_THE_CURRENT_FUNCTION_OF_YOUR_KNEE_DURING_YOUR_USUAL_DAILY_ACTIVITIES_ON_A_SCALE_FROM_0_TO_100_WITH_100_BEING_YOUR_LEVEL_OF_KNEE_FUNCTION_PRIOR_TO_YOUR_INJURY_AND_0_BEING_THE_INABILITY_TO_PERFORM_ANY_OF_YOUR_USUAL_DAILY_ACTIVITIES?: 50
STARTING_AND_STOPPING_QUICKLY: MODERATE DIFFICULTY
WALKING_15_MINUTES_OR_GREATER: EXTREME DIFFICULTY
CUTTING/LATERAL_MOVEMENTS: EXTREME DIFFICULTY
LIGHT_TO_MODERATE_WORK: MODERATE DIFFICULTY
STAND: ACTIVITY IS SOMEWHAT DIFFICULT
WALKING_DOWN_STEEP_HILLS: SLIGHT DIFFICULTY
KNEE_ACTIVITY_OF_DAILY_LIVING_SCORE: 44.61
SQUAT: ACTIVITY IS VERY DIFFICULT
HOW_WOULD_YOU_RATE_YOUR_CURRENT_LEVEL_OF_FUNCTION_DURING_YOUR_USUAL_ACTIVITIES_OF_DAILY_LIVING_FROM_0_TO_100_WITH_100_BEING_YOUR_LEVEL_OF_FUNCTION_PRIOR_TO_YOUR_HIP_PROBLEM_AND_0_BEING_THE_INABILITY_TO_PERFORM_ANY_OF_YOUR_USUAL_DAILY_ACTIVITIES?: 40
DEEP SQUATTING: UNABLE TO DO
TWISTING/PIVOTING ON INVOLVED LEG: EXTREME DIFFICULTY
AS_A_RESULT_OF_YOUR_KNEE_INJURY,_HOW_WOULD_YOU_RATE_YOUR_CURRENT_LEVEL_OF_DAILY_ACTIVITY?: ABNORMAL
STAND: ACTIVITY IS SOMEWHAT DIFFICULT
HOS_ADL_HIGHEST_POTENTIAL_SCORE: 68
WALK: ACTIVITY IS FAIRLY DIFFICULT
KNEEL ON THE FRONT OF YOUR KNEE: ACTIVITY IS SOMEWHAT DIFFICULT
WEAKNESS: THE SYMPTOM AFFECTS MY ACTIVITY MODERATELY
RISE FROM A CHAIR: ACTIVITY IS FAIRLY DIFFICULT
WALKING_INITIALLY: SLIGHT DIFFICULTY
ADL_TOTAL_ITEM_SCORE: 0
WALKING_INITIALLY: SLIGHT DIFFICULTY
ADL_SCORE(%): 0
RECREATIONAL ACTIVITIES: MODERATE DIFFICULTY
LOW_IMPACT_ACTIVITIES_LIKE_FAST_WALKING: MODERATE DIFFICULTY
SITTING FOR 15 MINUTES: MODERATE DIFFICULTY
DEEP_SQUATTING: UNABLE TO DO
HOS_ADL_SCORE(%): 48.53
PUTTING ON SOCKS AND SHOES: MODERATE DIFFICULTY
WALKING_UP_STEEP_HILLS: MODERATE DIFFICULTY
PLEASE_INDICATE_YOR_PRIMARY_REASON_FOR_REFERRAL_TO_THERAPY:: HIP
ABILITY_TO_PERFORM_ACTIVITY_WITH_YOUR_NORMAL_TECHNIQUE: MODERATE DIFFICULTY
SIT WITH YOUR KNEE BENT: ACTIVITY IS FAIRLY DIFFICULT
GO DOWN STAIRS: ACTIVITY IS SOMEWHAT DIFFICULT
GOING UP 1 FLIGHT OF STAIRS: MODERATE DIFFICULTY
RUNNING_ONE_MILE: UNABLE TO DO
GOING_UP_1_FLIGHT_OF_STAIRS: MODERATE DIFFICULTY
HOW_WOULD_YOU_RATE_YOUR_CURRENT_LEVEL_OF_FUNCTION?: ABNORMAL
RAW_SCORE: 31.23
ADL_COUNT: 17
HOW_WOULD_YOU_RATE_YOUR_CURRENT_LEVEL_OF_FUNCTION_DURING_YOUR_USUAL_ACTIVITIES_OF_DAILY_LIVING_FROM_0_TO_100_WITH_100_BEING_YOUR_LEVEL_OF_FUNCTION_PRIOR_TO_YOUR_HIP_PROBLEM_AND_0_BEING_THE_INABILITY_TO_PERFORM_ANY_OF_YOUR_USUAL_DAILY_ACTIVITIES?: 40
GETTING_INTO_AND_OUT_OF_A_BATHTUB: SLIGHT DIFFICULTY
WALK: ACTIVITY IS FAIRLY DIFFICULT
SWINGING_OBJECTS_LIKE_A_GOLF_CLUB: EXTREME DIFFICULTY
PUTTING_ON_SOCKS_AND_SHOES: MODERATE DIFFICULTY
SIT WITH YOUR KNEE BENT: ACTIVITY IS FAIRLY DIFFICULT
STEPPING UP AND DOWN CURBS: MODERATE DIFFICULTY
WALKING_15_MINUTES_OR_GREATER: EXTREME DIFFICULTY
STIFFNESS: THE SYMPTOM AFFECTS MY ACTIVITY MODERATELY
WALKING_UP_STEEP_HILLS: MODERATE DIFFICULTY
WEAKNESS: THE SYMPTOM AFFECTS MY ACTIVITY MODERATELY
ABILITY_TO_PARTICIPATE_IN_YOUR_DESIRED_SPORT_AS_LONG_AS_YOU_WOULD_LIKE: UNABLE TO DO
SQUAT: ACTIVITY IS VERY DIFFICULT
KNEE_ACTIVITY_OF_DAILY_LIVING_SUM: 29

## 2024-11-13 ENCOUNTER — THERAPY VISIT (OUTPATIENT)
Dept: PHYSICAL THERAPY | Facility: CLINIC | Age: 74
End: 2024-11-13
Payer: MEDICARE

## 2024-11-13 DIAGNOSIS — M25.552 HIP PAIN, LEFT: ICD-10-CM

## 2024-11-13 PROCEDURE — 97161 PT EVAL LOW COMPLEX 20 MIN: CPT | Mod: GP | Performed by: PHYSICAL THERAPIST

## 2024-11-13 PROCEDURE — 97110 THERAPEUTIC EXERCISES: CPT | Mod: GP | Performed by: PHYSICAL THERAPIST
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
## 2025-01-30 DIAGNOSIS — K44.9 HIATAL HERNIA: ICD-10-CM

## 2025-02-06 ENCOUNTER — VIRTUAL VISIT (OUTPATIENT)
Dept: FAMILY MEDICINE | Facility: CLINIC | Age: 75
End: 2025-02-06
Payer: MEDICARE

## 2025-02-06 ENCOUNTER — MYC REFILL (OUTPATIENT)
Dept: FAMILY MEDICINE | Facility: CLINIC | Age: 75
End: 2025-02-06

## 2025-02-06 DIAGNOSIS — R19.5 POSITIVE COLORECTAL CANCER SCREENING USING COLOGUARD TEST: ICD-10-CM

## 2025-02-06 DIAGNOSIS — M25.562 CHRONIC PAIN OF LEFT KNEE: ICD-10-CM

## 2025-02-06 DIAGNOSIS — M79.18 MYOFASCIAL PAIN SYNDROME: Chronic | ICD-10-CM

## 2025-02-06 DIAGNOSIS — M25.552 HIP PAIN, LEFT: ICD-10-CM

## 2025-02-06 DIAGNOSIS — E05.00 GRAVES DISEASE: Primary | ICD-10-CM

## 2025-02-06 DIAGNOSIS — E05.00 GRAVES DISEASE: ICD-10-CM

## 2025-02-06 DIAGNOSIS — K44.9 HIATAL HERNIA: ICD-10-CM

## 2025-02-06 DIAGNOSIS — G89.29 CHRONIC PAIN OF LEFT KNEE: ICD-10-CM

## 2025-02-06 RX ORDER — ATENOLOL 25 MG/1
25 TABLET ORAL DAILY
Qty: 90 TABLET | Refills: 1 | OUTPATIENT
Start: 2025-02-06

## 2025-02-06 RX ORDER — OMEPRAZOLE 20 MG/1
20 CAPSULE, DELAYED RELEASE ORAL DAILY
Qty: 90 CAPSULE | Refills: 0 | Status: SHIPPED | OUTPATIENT
Start: 2025-02-06

## 2025-02-06 RX ORDER — IBUPROFEN 600 MG/1
600 TABLET, FILM COATED ORAL EVERY 8 HOURS PRN
Qty: 90 TABLET | Refills: 0 | Status: SHIPPED | OUTPATIENT
Start: 2025-02-06

## 2025-02-06 RX ORDER — ATENOLOL 25 MG/1
25 TABLET ORAL DAILY
Qty: 90 TABLET | Refills: 1 | Status: SHIPPED | OUTPATIENT
Start: 2025-02-06

## 2025-02-06 RX ORDER — GABAPENTIN 300 MG/1
300 CAPSULE ORAL 3 TIMES DAILY
Qty: 90 CAPSULE | Refills: 1 | Status: SHIPPED | OUTPATIENT
Start: 2025-02-06

## 2025-02-06 NOTE — PROGRESS NOTES
Monique is a 74 year old who is being evaluated via a billable telephone visit.    What phone number would you like to be contacted at? [phone]  How would you like to obtain your AVS? Cici  Originating Location (pt. Location): Home    Distant Location (provider location):  On-site    Video start time 956 am  Video end time:1008 AM  Provider in office  Patient at home  [employer]    If patient has telephone visit, have they been educated on video visit as preferred visit method and offered to change to video visit? yes        Instructions Relayed to Patient by Virtual Roomer:         Patient Confirmed they will join visit via: Text Link to Cell Phone  Reminded patient to ensure they were logged on to virtual visit by arrival time listed.   Asked if patient has flexibility to initiate visit sooner than arrival time: patient stated yes, documented in appointment notes availability to initiate visit earlier than arrival time     If pediatric virtual visit, ensured pediatric patient along with parent/guardian will be present for video visit.     Patient offered the website [email or web address] and/or phone number to [employer] Help line: [phone]   Assessment & Plan     Graves disease  -managed by Endocrine  -on Methimazole   - atenolol (TENORMIN) 25 MG tablet  Dispense: 90 tablet; Refill: 1    Chronic pain of left knee  -discussed GI side effects of medication, take with food   - ibuprofen (ADVIL/MOTRIN) 600 MG tablet  Dispense: 90 tablet; Refill: 0    Hiatal hernia  -refill on medication provided   - omeprazole (PRILOSEC) 20 MG DR capsule  Dispense: 90 capsule; Refill: 0    Hip pain, left  -increased Gabapentin dose to 300 mg three times a day  -tolerated without side effects 100 mg dose   - gabapentin (NEURONTIN) 300 MG capsule  Dispense: 90 capsule; Refill: 1    Myofascial pain syndrome  - gabapentin (NEURONTIN) 300 MG capsule  Dispense: 90 capsule; Refill: 1    Positive colorectal cancer  "screening using Cologuard test  -positive cologuard, did not schedule colonoscopy at last referral   - Colonoscopy Screening  Referral      I ended our visit today by discussing the patient's diagnoses and recommended treatment. Please refer to today's diagnoses and orders for further details. I briefly discussed the pathophysiology of these conditions and outlined their expected course. I discussed the warning symptoms and signs that indicate an atypical course that would need urgent or emergent care. I also discussed self care strategies for symptom relief.  Common side effects of medications prescribed at this visit were discussed with the patient. Severe side effects, including current applicable black box warnings, were discussed.         BMI  Estimated body mass index is 27.46 kg/m  as calculated from the following:    Height as of 10/21/24: 1.6 m (5' 3\").    Weight as of 10/21/24: 70.3 kg (155 lb).       Christine Amaya is a 74 year old, presenting for the following health issues:  Musculoskeletal Problem and Recheck Medication        2/6/2025     8:57 AM   Additional Questions   Roomed by Lorena   Accompanied by self         2/6/2025     8:57 AM   Patient Reported Additional Medications   Patient reports taking the following new medications none     Musculoskeletal Problem    History of Present Illness       Back Pain:  She presents for follow up of back pain. Patient's back pain is a chronic problem.  Location of back pain:  Right middle of back and left middle of back  Description of back pain: gnawing  Back pain spreads: left buttocks, left thigh and left knee    Since patient first noticed back pain, pain is: always present, but gets better and worse  Does back pain interfere with her job:  No       Reason for visit:  Medication refill, back pain, leg pain    She eats 2-3 servings of fruits and vegetables daily.She consumes 0 sweetened beverage(s) daily.She exercises with enough effort to " increase her heart rate 9 or less minutes per day.  She exercises with enough effort to increase her heart rate 3 or less days per week.   She is taking medications regularly.  Patient states the gabapentin is not seeming to help any.  Patient states she been doing Physical therapy and doesn't seem to help and she feels worse after PT.      Last saw patient 5/24, I have only seen her ONCE in clinic.  Has been followed by Endo for Graves, anorexia and osteoporosis. 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  She has background history of osteoporosis, COPD, fibromyalgia, myofascial pain syndrome Graves' disease and A-fib with RVR.   DEXA bone scan on 3/2/2023     Ibuprofen as needed, several times a week    Ran out of Omeprazole some time ago  No rectal bleeding  No melena  Cologuard done 6/24. Positive, did not schedule colonoscopy        Objective           Vitals:  No vitals were obtained today due to virtual visit.    Physical Exam   General: Alert and no distress //Respiratory: No audible wheeze, cough, or shortness of breath // Psychiatric:  Appropriate affect, tone, and pace of words            Signed Electronically by: Gabriella Cuenca MD

## 2025-02-06 NOTE — PATIENT INSTRUCTIONS
At [facility], we strive to deliver an exceptional experience to you, every time we see you. If you receive a survey, please let us know what we are doing well and/or what we could improve upon, as we do value your feedback.  If you have MyChart, you can expect to receive results automatically within 24 hours of their completion.  Your provider will send a note interpreting your results as well.   If you do not have MyChart, you should receive your results in about a week by mail.    Your care team:                            Family Medicine Internal Medicine   MD Roger Osborne, MD Samreen Grossman, MD Olivier Aguillon, MD Iveth Stout, PAMinervaC    Geoffrey Rogers, MD Pediatrics   Gabriella Cuenca, MD Mikki Castro, MD Nadine Benavidez, APRN CNP Verito Garsia APRN CNP   MD Eri Oliveira, MD Tonja Jacobson, CNP     David Rivas, CNP Same-Day Provider (No follow-up visits)   SHAD Caicedo, DNP Yoly Hsu, SHAD Thompson, FNP, BC LUCAS PerdueC     Clinic hours: Monday - Thursday 7 am-6 pm; Fridays 7 am-5 pm.   Urgent care: Monday - Friday 10 am- 8 pm; Saturday and Sunday 9 am-5 pm.    Clinic: [phone]       [facility]: Monday - Thursday 8 am - 7 pm; Friday 8 am - 6 pm  [facility]: [phone]

## 2025-02-12 ENCOUNTER — TELEPHONE (OUTPATIENT)
Dept: GASTROENTEROLOGY | Facility: CLINIC | Age: 75
End: 2025-02-12
Payer: MEDICARE

## 2025-02-12 NOTE — TELEPHONE ENCOUNTER
"Endoscopy Scheduling Screen    Have you had any respiratory illness or flu-like symptoms in the last 10 days?  No    What is your communication preference for Instructions and/or Bowel Prep?   MyChart    What insurance is in the chart?  Other:  Medicare    Ordering/Referring Provider: Bang   (If ordering provider performs procedure, schedule with ordering provider unless otherwise instructed. )    BMI: Estimated body mass index is 27.46 kg/m  as calculated from the following:    Height as of 10/21/24: 1.6 m (5' 3\").    Weight as of 10/21/24: 70.3 kg (155 lb).     Sedation Ordered  moderate sedation.   If patient BMI > 50 do not schedule in ASC.    If patient BMI > 45 do not schedule at [facility].    Are you taking methadone or Suboxone?  NO, No RN review required.    Have you been diagnosed and are being treated for severe PTSD or severe anxiety?  NO, No RN review required.    Are you taking any prescription medications for pain 3 or more times per week?   NO, No RN review required.    Do you have a history of malignant hyperthermia?  No    (Females) Are you currently pregnant?        Have you been diagnosed or told you have pulmonary hypertension?   No    Do you have an LVAD?  No    Have you been told you have moderate to severe sleep apnea?  No.    Have you been told you have COPD, asthma, or any other lung disease?  Yes     What breathing problems do you have?  Both COPD and Asthma      Do you use home oxygen?  No    Have your breathing problems required an ED visit or hospitalization in the last year?  No.    Do you have any heart conditions?  Yes     In the past year, have you had any hospitalizations for heart related issues including cardiomyopathy, heart attack, or stent placement?  Yes (RN Review required for scheduling.)    Do you have any implantable devices in your body (pacemaker, ICD)?  No    Do you take nitroglycerine?  No    Have you ever had or are you waiting for an organ transplant?  No. Continue " "scheduling, no site restrictions.    Have you had a stroke or transient ischemic attack (TIA aka \"mini stroke\" in the last 6 months?   No    Have you been diagnosed with or been told you have cirrhosis of the liver?   No.    Are you currently on dialysis?   No    Do you need assistance transferring?   No    BMI: Estimated body mass index is 27.46 kg/m  as calculated from the following:    Height as of 10/21/24: 1.6 m (5' 3\").    Weight as of 10/21/24: 70.3 kg (155 lb).     Is patients BMI > 40 and scheduling location [facility]?  No    Do you take an injectable or oral medication for weight loss or diabetes (excluding insulin)?  No    Do you take the medication Naltrexone?  No    Do you take blood thinners?  No       Prep   Are you currently on dialysis or do you have chronic kidney disease?  No    Do you have a diagnosis of diabetes?  No    Do you have a diagnosis of cystic fibrosis (CF)?  No    On a regular basis do you go 3 -5 days between bowel movements?  No    BMI > 40?  No    Preferred Pharmacy:    [place], [place] - [address]  [address] [address]  Phone: [phone] Fax: [phone] Alternate Fax: [phone], [phone]      Final Scheduling Details     Procedure scheduled  Colonoscopy    Surgeon:  Jacoby     Date of procedure:  3/12/25     Pre-OP / PAC:   No - Not required for this site.    Location  [facility] - Patient preference.    Sedation   Moderate Sedation - Per order.      Patient Reminders:   You will receive a call from a Nurse to review instructions and health history.  This assessment must be completed prior to your procedure.  Failure to complete the Nurse assessment may result in the procedure being cancelled.      On the day of your procedure, please designate an adult(s) who can drive you home stay with you for the next 24 hours. The medicines used in the exam will make you sleepy. You will not be able to drive.      You cannot take " public transportation, ride share services, or non-medical taxi service without a responsible caregiver.  Medical transport services are allowed with the requirement that a responsible caregiver will receive you at your destination.  We require that drivers and caregivers are confirmed prior to your procedure.

## 2025-02-12 NOTE — TELEPHONE ENCOUNTER
"Pre Assessment RN Review    Focused Assessments    Cardiac Review    ED visit for palpitations 9/16/24.      Has the patient had a stent placed in the last year?       NO.      Scheduling Status & Recommendations    Sedation: Moderate Sedation - Per order.  Location Type: Hospital - Patient preference.    Patient would like to go to [facility].  Writer has sent below message to Dr Brooks and Dr Brian as FYI / review.          Patient with history of a chronic obstructive asthma and graves' disease  who presented to the emergency department for tachycardia on 9/16/25   .   During visit they obtained EKG which confirmed Afib with RVR and TSH less than 0.01 and free T4 elevated at 5.20.     After completion of care in the emergency department, the patient was admitted to inpatient.     No recommendation for further cardiac workup or referral for cardiology visit.      Pt continues to follow endocrinologist for symptoms.     Noted 10/21/24 endo OV:  \"She was admitted to the hospital on 9/16/2024 with lightheadedness and palpitation was found to have new onset of A-fib with RVR was found to have thyrotoxicosis due to nonadherence with the medications as she had lapse in her insurance.\"     Corinne Kliber, RN  Endoscopy Procedure Pre Assessment RN  [phone] option 2      " 2

## 2025-02-13 NOTE — TELEPHONE ENCOUNTER
From: Abdelrahman Brian MD   Sent: 2/13/2025   9:21 AM CST   To: Corinne Kliber, RN; Tyler Brooks MD   Subject: RE: Review for recent hospital visit d/t pal*     Based on that history I would suggest mac with pac visit to ensure she is optimized.       Relayed recommendation to Yun Sotomayor endoscopy .    Corinne Kliber, RN  Endoscopy Procedure Pre Assessment RN  [phone] option 2

## 2025-02-13 NOTE — TELEPHONE ENCOUNTER
Caller: Monique    Reason for Reschedule/Cancellation (please be detailed, any staff messages or encounters to note?):   Patient needs MAC and PAC eval per further review    Did you cancel or rescheduled an EUS procedure? No.    Is screening questionnaire older than 3 months from the reschedule date.   If Yes, please complete screening questionnaire. No    Prior to reschedule please review:  Ordering Provider: Bang Romo Determined: MAC  Does patient have any ASC Exclusions, please identify?: Yes-heart issues in the last year    Notes on Cancelled Procedure:  Procedure: Lower Endoscopy [Colonoscopy]   Date: 3/12/25  Location: [facility] [place]; [address], [place] [address]  Surgeon: Jacoby    Rescheduled: Yes,   Procedure: Lower Endoscopy [Colonoscopy]    Date: 5/6/25   Location: [facility]; [address], [address], [place] [address]    Surgeon: Jacoby   Sedation Level Scheduled  MAC ,  Reason for Sedation Level Per RN   Instructions updated and sent: Yes     Does patient need PAC or Pre -Op Rescheduled? : Yes-PAC appt scheduled 4/29/25

## 2025-04-04 ENCOUNTER — OFFICE VISIT (OUTPATIENT)
Dept: ENDOCRINOLOGY | Facility: CLINIC | Age: 75
End: 2025-04-04
Payer: MEDICARE

## 2025-04-04 ENCOUNTER — LAB (OUTPATIENT)
Dept: LAB | Facility: CLINIC | Age: 75
End: 2025-04-04
Payer: MEDICARE

## 2025-04-04 VITALS
WEIGHT: 168 LBS | OXYGEN SATURATION: 99 % | DIASTOLIC BLOOD PRESSURE: 75 MMHG | BODY MASS INDEX: 29.77 KG/M2 | HEART RATE: 80 BPM | HEIGHT: 63 IN | SYSTOLIC BLOOD PRESSURE: 122 MMHG

## 2025-04-04 DIAGNOSIS — E05.00 GRAVES DISEASE: Chronic | ICD-10-CM

## 2025-04-04 DIAGNOSIS — E05.00 GRAVES DISEASE: Primary | ICD-10-CM

## 2025-04-04 DIAGNOSIS — R13.10 DYSPHAGIA, UNSPECIFIED TYPE: ICD-10-CM

## 2025-04-04 DIAGNOSIS — M81.0 AGE-RELATED OSTEOPOROSIS WITHOUT CURRENT PATHOLOGICAL FRACTURE: ICD-10-CM

## 2025-04-04 LAB
T3 SERPL-MCNC: 125 NG/DL (ref 85–202)
T4 FREE SERPL-MCNC: 0.89 NG/DL (ref 0.9–1.7)

## 2025-04-04 PROCEDURE — 99215 OFFICE O/P EST HI 40 MIN: CPT | Performed by: STUDENT IN AN ORGANIZED HEALTH CARE EDUCATION/TRAINING PROGRAM

## 2025-04-04 PROCEDURE — 3074F SYST BP LT 130 MM HG: CPT | Performed by: STUDENT IN AN ORGANIZED HEALTH CARE EDUCATION/TRAINING PROGRAM

## 2025-04-04 PROCEDURE — 99417 PROLNG OP E/M EACH 15 MIN: CPT | Performed by: STUDENT IN AN ORGANIZED HEALTH CARE EDUCATION/TRAINING PROGRAM

## 2025-04-04 PROCEDURE — 36415 COLL VENOUS BLD VENIPUNCTURE: CPT

## 2025-04-04 PROCEDURE — G2211 COMPLEX E/M VISIT ADD ON: HCPCS | Performed by: STUDENT IN AN ORGANIZED HEALTH CARE EDUCATION/TRAINING PROGRAM

## 2025-04-04 PROCEDURE — 84439 ASSAY OF FREE THYROXINE: CPT

## 2025-04-04 PROCEDURE — 3078F DIAST BP <80 MM HG: CPT | Performed by: STUDENT IN AN ORGANIZED HEALTH CARE EDUCATION/TRAINING PROGRAM

## 2025-04-04 PROCEDURE — 84480 ASSAY TRIIODOTHYRONINE (T3): CPT

## 2025-04-04 PROCEDURE — 1126F AMNT PAIN NOTED NONE PRSNT: CPT | Performed by: STUDENT IN AN ORGANIZED HEALTH CARE EDUCATION/TRAINING PROGRAM

## 2025-04-04 RX ORDER — DIPHENHYDRAMINE HYDROCHLORIDE 50 MG/ML
25 INJECTION, SOLUTION INTRAMUSCULAR; INTRAVENOUS
Start: 2025-04-11

## 2025-04-04 RX ORDER — METHYLPREDNISOLONE SODIUM SUCCINATE 40 MG/ML
40 INJECTION INTRAMUSCULAR; INTRAVENOUS
Start: 2025-04-11

## 2025-04-04 RX ORDER — HEPARIN SODIUM (PORCINE) LOCK FLUSH IV SOLN 100 UNIT/ML 100 UNIT/ML
5 SOLUTION INTRAVENOUS
OUTPATIENT
Start: 2025-04-11

## 2025-04-04 RX ORDER — ACETAMINOPHEN 325 MG/1
650 TABLET ORAL
OUTPATIENT
Start: 2025-04-11

## 2025-04-04 RX ORDER — MEPERIDINE HYDROCHLORIDE 25 MG/ML
25 INJECTION INTRAMUSCULAR; INTRAVENOUS; SUBCUTANEOUS
OUTPATIENT
Start: 2025-04-11

## 2025-04-04 RX ORDER — HEPARIN SODIUM,PORCINE 10 UNIT/ML
5-20 VIAL (ML) INTRAVENOUS DAILY PRN
OUTPATIENT
Start: 2025-04-11

## 2025-04-04 RX ORDER — ALBUTEROL SULFATE 0.83 MG/ML
2.5 SOLUTION RESPIRATORY (INHALATION)
OUTPATIENT
Start: 2025-04-11

## 2025-04-04 RX ORDER — ALBUTEROL SULFATE 90 UG/1
1-2 INHALANT RESPIRATORY (INHALATION)
Start: 2025-04-11

## 2025-04-04 RX ORDER — ZOLEDRONIC ACID 0.05 MG/ML
5 INJECTION, SOLUTION INTRAVENOUS ONCE
Start: 2025-04-11

## 2025-04-04 RX ORDER — EPINEPHRINE 1 MG/ML
0.3 INJECTION, SOLUTION, CONCENTRATE INTRAVENOUS EVERY 5 MIN PRN
OUTPATIENT
Start: 2025-04-11

## 2025-04-04 RX ORDER — DIPHENHYDRAMINE HYDROCHLORIDE 50 MG/ML
50 INJECTION, SOLUTION INTRAMUSCULAR; INTRAVENOUS
Start: 2025-04-11

## 2025-04-04 RX ORDER — DOXYCYCLINE 100 MG/1
CAPSULE ORAL
COMMUNITY
Start: 2024-04-12

## 2025-04-04 RX ORDER — METHIMAZOLE 5 MG/1
15 TABLET ORAL DAILY
Qty: 270 TABLET | Refills: 2 | Status: SHIPPED | OUTPATIENT
Start: 2025-04-04 | End: 2025-04-05

## 2025-04-04 ASSESSMENT — PAIN SCALES - GENERAL: PAINLEVEL_OUTOF10: NO PAIN (0)

## 2025-04-04 NOTE — PATIENT INSTRUCTIONS
- To rebook the appointment with the eye doctor  - To start using artificial tear  - To call to make an appointment for Reclast infusion at the infusion clinic      - I will see you back in 3 months with labs         Imaging (DEXA, CT, MRI, XRAY)    [facility] ([facility] [place]) [phone]   [facility] ([place], [place]) [phone]   [facility] ([facility]) [phone]   [facility] ([facility]) [phone]       Lab    General [phone]   [facility] [phone]   [place] [phone]   [facility]  [phone]   [facility] [phone]   [place] [phone]   [facility] [place]) [phone]   [facility]   [place] [phone]   [place] [phone]   [place] [phone]   [place] [phone]       Infusion    [facility] [phone]   [place] [phone]   [place] [phone]   [place] [phone]   [place] [phone]   [place] [phone]   [place] [phone]     For any questions, please reach out to the Endocrinology Clinic Number for assistance: [phone].

## 2025-04-04 NOTE — LETTER
4/4/2025       RE: Monique Gupta  [address] [facility] [id number]     Dear Colleague,    Thank you for referring your patient, Monique Gupta, to the [facility] [place] at [facility]. Please see a copy of my visit note below.    Endocrinology Clinic Visit 4/4/2025    NAME:  Monique Gupta  PCP:  Gabriella Cuenca  MRN:  [id number]  Reason for Consult: For follow-up osteoporosis and history of Graves' disease  Requesting Provider:  No ref. provider found    Chief Complaint     Chief Complaint   Patient presents with     Thyroid Disease       History of Present Illness     Monique Gupta is a 75 year old female who is seen in clinic for follow follow-up for history of Graves' disease and osteoporosis.  She has background history of osteoporosis, COPD, fibromyalgia, myofascial pain syndrome Graves' disease and A-fib with RVR .    Last visit was on 10/21/2024.    History of Graves' disease:  She was diagnosed with Graves' disease in 2011 since the time of the diagnosis in [place] her thyrotoxicosis has been not controlled due to nonadherence to the medications.  For the last few years she has been prescribed different doses of methimazole ranging between 10-60 mg.  She stopped taking methimazole completely in 2021 however developed recurrence of thyrotoxicosis restarted back on methimazole by her previous endocrinologist Dr. Peres in [place].  During her last visit with endocrinology in [place] on: She was placed on methimazole 10 mg twice daily and propranolol 10 mg 4 times a day as needed.  At that time she had concerns about weight gain associated with methimazole use.  At that time they discussed with her considering thyroidectomy given multiple episodes of recurrence and challenge of achieving a euthyroid status.     During the admission to the hospital:  9/16/2024: TSH<0.01, free  T45.20  9/18/2024: Free T4 4.5 Total T3 elevated 268     Converted to sinus rhythm with diltiazem.  Discharge from the hospital on:  Propranolol ER 60 mg daily.  Methimazole 30 mg daily.    10/21/2024 visit: Was on methimazole 30 mg daily propranolol ER 60 mg daily with good adherence denied thyrotoxicosis symptoms.  Endorsed ongoing dysphagia for years.  Discussed the different options of treating thyrotoxicosis due to Graves' disease she decided she would like to continue with antithyroid medications.  Continued on the same doses of methimazole and propranolol.    10/21/2024: Free T4 normalized 1.51, total T3 normalized 138.  Advised to reduce methimazole dose down to 15 mg daily and to get repeat lab test in 1 month however she did not get it done.    On assessment today:  Stated she takes methimazole 15 mg daily since end of October 2024  Adherence: no missed doses   No Palpitation, No heat intolerance, no excessive sweating, has ongoing tremor , has increased anxiety/irritability related it to the sickness of her BF who has lung cancer ,has ongoing   insomnia for her whole life , no weight loss, no diarrhea.   No Fatigue, no cold intolerance, has weight gain of 13 Ib since the last visit , no constipation, no excessive skin dryness,has hair loss related it to the stress,.  She did not have this video swallowing study or appointment with SLP was ordered on the last visit.  Appointment with ophthalmology Dr. Hazel on 11/12/2024 was canceled.  Compression symptoms: has ongoing dysphagia with solid food needs to drink after eating to push food down , no dyspnea , no change in voice   Ocular symptoms: cancelled the appointment with ophthalmology because her BF had session of chemotherapy on that day but still complains of FB sensation and dryness with itchiness, gets blurry vision sometimes , not using artificial tears      TSH <0.008 (L) 02/28/2023 09:33 AM  TSH 9.072 (H) 10/14/2022 08:55 AM  TSH <0.008 (L)  06/24/2022 09:13 AM  FREET4 2.82 (H) 02/28/2023 09:33 AM  FREET4 0.58 (L) 10/14/2022 08:55 AM  FREET4 1.70 (H) 06/24/2022 09:13 AM   TRAB 23.20 (H) 02/28/2023 09:33 AM   9/16/2024: TSH<0.01, free T45.20  9/18/2024: Free T4 4.5 Total T3 elevated 268  10/21/2024: Total T3 normal 138, free T4 normal 1.51.     US thyroid on Sept 20, 2019. Enlarged.   Right Lobe: 5.7 x 2.2 x 2.3 cm. Diffuse heterogeneity and prominent blood flow without discrete nodule. Left Lobe: 5.7 x 1.8 x 2.5 cm. Diffuse heterogeneity and prominent blood flow without discrete nodule. Thyroid isthmus measures 5 mm.      History of osteoporosis:  DEXA bone scan on 3/2/2023:  Osteoporosis left forearm T-score -3.6.  Osteopenia right femoral neck T-score -1.1, osteopenia left total hip T-score -1.2.  Normal BMD lumbar spine.  History of fractures: no   Vitamin D supplements: Started on vitamin D 1000 units daily on the last visit and started on calcium carbonate elemental 600 mg twice daily. And takes calcium twice daily   Calcium intake: no milk , no yogurt , no ice-cream , eats some cheese  Parental history of osteoporosis: Mother at age of 90 years had hip fracture   Steroid use: no   History of gastric bypass/malabsorption: no   History of rheumatoid arthritis: no   Alcohol: intake: none  Smoking : no  LMP:  at age 57 years old  GERD: yes she takes Omperazole   Last dental visit: was September 2024 no planned procedure .     Labs:  10/21/2024: Measured calcium 9.7, creatinine 0.79, PTH normal 40, phosphorus normal at 3.3, 25-hydroxy vitamin D3 61.    Discussed on the last visit considering starting Reclast therapy given the severity of osteoporosis however stated would like to think about it.  Today she agreed to start on Reclast therapy.        Problem List     Patient Active Problem List   Diagnosis     Age-related osteoporosis without current pathological fracture     Anorexia     Arthralgia of multiple sites     Chronic obstructive asthma (H)      Chronic tension-type headache     Dyslipidemia     Eating disorder     Fibromyalgia     Graves disease     History of hepatitis C     Myofascial pain syndrome     Vitamin D deficiency     Chronic left-sided low back pain with left-sided sciatica     Hip pain, left     Hyperthyroidism     Atrial fibrillation with RVR (H)        Medications     Current Outpatient Medications   Medication Sig Dispense Refill     albuterol (PROAIR HFA/PROVENTIL HFA/VENTOLIN HFA) 108 (90 Base) MCG/ACT inhaler Inhale 2 puffs into the lungs every 4 hours as needed for shortness of breath or wheezing. 18 g 2     ALPRAZolam (XANAX) 0.25 MG tablet Take 0.25 mg by mouth nightly as needed       atenolol (TENORMIN) 25 MG tablet Take 1 tablet (25 mg) by mouth daily. 90 tablet 1     calcium carbonate (OS-JENNIFER) 1500 (600 Ca) MG tablet Take 1 tablet (600 mg) by mouth 2 times daily (with meals). 180 tablet 3     dextran 70-hypromellose (TEARS NATURALE FREE PF) 0.1-0.3 % ophthalmic solution Place 1 drop into both eyes daily as needed (itchiness). 36 each 3     doxycycline hyclate (VIBRAMYCIN) 100 MG capsule take 1 capsule (100 mg) by mouth twice a day for 7 days.       fluticasone-salmeterol (ADVAIR) 500-50 MCG/ACT inhaler Inhale 1 puff into the lungs 2 times daily. 60 each 1     gabapentin (NEURONTIN) 300 MG capsule Take 1 capsule (300 mg) by mouth 3 times daily. 90 capsule 1     ibuprofen (ADVIL/MOTRIN) 600 MG tablet Take 1 tablet (600 mg) by mouth every 8 hours as needed for moderate pain. 90 tablet 0     ipratropium - albuterol 0.5 mg/2.5 mg/3 mL (DUONEB) 0.5-2.5 (3) MG/3ML neb solution Take 1 vial (3 mLs) by nebulization every 6 hours as needed for shortness of breath, wheezing or cough 90 mL 0     methimazole (TAPAZOLE) 5 MG tablet Take 3 tablets (15 mg) by mouth daily. 270 tablet 2     omeprazole (PRILOSEC) 20 MG DR capsule Take 1 capsule (20 mg) by mouth daily. 90 capsule 0     Vitamin D3 (CHOLECALCIFEROL) 25 mcg (1000 units) tablet Take 1  tablet (25 mcg) by mouth daily. 90 tablet 3     No current facility-administered medications for this visit.        Allergies     Allergies   Allergen Reactions     Hydrocodone-Acetaminophen Itching     Penicillins Hives and Nausea and Vomiting     CONV. REACTION:Hives, CONV. REACTION:Vomiting     Sulfa Antibiotics Hives and Nausea and Vomiting     CONV. REACTION:Hives, CONV. REACTION:Vomiting       Medical / Surgical History     No past medical history on file.  No past surgical history on file.    Social History     Social History     Socioeconomic History     Marital status:      Spouse name: Not on file     Number of children: Not on file     Years of education: Not on file     Highest education level: Not on file   Occupational History     Not on file   Tobacco Use     Smoking status: Never     Passive exposure: Never     Smokeless tobacco: Never   Vaping Use     Vaping status: Never Used   Substance and Sexual Activity     Alcohol use: Not on file     Drug use: Not on file     Sexual activity: Not on file   Other Topics Concern     Not on file   Social History Narrative     Not on file     Social Drivers of Health     Financial Resource Strain: Low Risk  (5/23/2024)    Financial Resource Strain      Within the past 12 months, have you or your family members you live with been unable to get utilities (heat, electricity) when it was really needed?: No   Food Insecurity: Low Risk  (5/23/2024)    Food Insecurity      Within the past 12 months, did you worry that your food would run out before you got money to buy more?: No      Within the past 12 months, did the food you bought just not last and you didn t have money to get more?: No   Transportation Needs: Low Risk  (5/23/2024)    Transportation Needs      Within the past 12 months, has lack of transportation kept you from medical appointments, getting your medicines, non-medical meetings or appointments, work, or from getting things that you need?: No  "  Physical Activity: Not on file   Stress: Not on file   Social Connections: Unknown (2/21/2024)    Received from [facility]    Social Connections      In the past 3 months, do you feel that you lack companionship or social support?: Not on file   Interpersonal Safety: Low Risk  (5/23/2024)    Interpersonal Safety      Do you feel physically and emotionally safe where you currently live?: Yes      Within the past 12 months, have you been hit, slapped, kicked or otherwise physically hurt by someone?: No      Within the past 12 months, have you been humiliated or emotionally abused in other ways by your partner or ex-partner?: No   Housing Stability: Low Risk  (5/23/2024)    Housing Stability      Do you have housing? : Yes      Are you worried about losing your housing?: No       Family History     No family history on file.    ROS     12 ROS completed, pertinent positive and negative in HPI    Physical Exam   /75   Pulse 80   Ht 1.6 m (5' 3\")   Wt 76.2 kg (168 lb)   SpO2 99%   BMI 29.76 kg/m       General: Comfortable, no obvious distress, normal body habitus  HENT: Atraumatic, mild diffuse thyromegaly no palpable nodules no cervical lymphadenopathy.  Eyes: Upper eyelids puffiness, no redness, no significant proptosis, no ophthalmoplegia.  No induced diplopia.  CV: normal rate.   Resp:  good effort, no evidence of loud wheezing   Skin: No rashes, lesions, or subcutaneous nodules on exposed skin.   Psych: Alert and oriented x 3. Appropriate affect, good insight  Musculoskeletal: Appropriate muscle bulk   Neuro: Moves all four extremities. No focal deficits on limited exam.  Bilateral hands tremor, normal reflexes.    Labs/Imaging     Pertinent Labs were reviewed and discussed briefly.  Radiology Results were  reviewed and discussed briefly.    Summary of recent findings:   No results found for: \"A1C\"    TSH   Date Value Ref Range Status   09/16/2024 <0.01 (L) 0.30 - 4.20 uIU/mL Final     Free T4   Date " Value Ref Range Status   04/04/2025 0.89 (L) 0.90 - 1.70 ng/dL Final   10/21/2024 1.51 0.90 - 1.70 ng/dL Final   09/18/2024 4.50 (H) 0.90 - 1.70 ng/dL Final   09/16/2024 5.20 (H) 0.90 - 1.70 ng/dL Final       Creatinine   Date Value Ref Range Status   10/21/2024 0.79 0.51 - 0.95 mg/dL Final      Latest Ref Rng 9/16/2024  7:42 PM 9/18/2024  6:28 AM 10/21/2024  12:15 PM 4/4/2025  9:17 AM   ENDO THYROID LABS-[facility]        TSH 0.30 - 4.20 uIU/mL <0.01 (L)       Triiodothyronine (T3) 85 - 202 ng/dL  268 (H)  138  125    FREE T4 0.90 - 1.70 ng/dL 5.20 (H)  4.50 (H)  1.51  0.89 (L)       Legend:  (L) Low  (H) High     Latest Ref Rng 10/21/2024  12:15 PM   ENDO CALCIUM LABS-[facility]     25 OH Vit D total 20 - 75 ug/L <66    25 OH Vit D2 ug/L <5    25 OH Vit D3 ug/L 61    Albumin 3.5 - 5.2 g/dL    Alkaline Phosphatase 40 - 150 U/L    Calcium 8.8 - 10.4 mg/dL 9.7    Urea Nitrogen 8.0 - 23.0 mg/dL 11.9    Creatinine 0.51 - 0.95 mg/dL 0.79    Magnesium 1.7 - 2.3 mg/dL    Parathyroid Hormone Intact 15 - 65 pg/mL 40      LUMBAR SPINE TWO TO THREE VIEWS   5/23/2024 11:55 AM      HISTORY: Left lumbar radiculopathy.     COMPARISON: None.                                                                      IMPRESSION: No fracture is identified. Moderate degenerative disc  disease at L2-L3. Background of mild degenerative disc disease.  Moderate lower lumbar spine facet arthropathy. Multiple grade 1  spondylolisthesis.    EXAMINATION:  DEXA BONE DENSITY SPINE AND HIP    DATE/TIME:  3/2/2023 10:35 AM    HISTORY:  Hyperthyroidism.    COMPARISON:  January 15, 2020.    TECHNIQUE:  Dual-energy X-ray absorptiometry (DXA) was performed on a Hologic central device.  Bone mineral density (BMD) measurements were obtained.  The following information on each individual patient can be found on the [facility] Physician Portal or on the [facility] PACS website ([email or web address]).      SPINE ANALYSIS (L1-L4):    Average lumbar BMD (g/cm2):  1.112  T-score:   0.6  Z-score:  2.9    CHANGE SINCE PRIOR SPINE EXAMINATION:  No comparison studies are available.    LEFT HIP ANALYSIS:    Left total hip BMD (g/cm2):  0.792  T-score:  -1.2  Z-score:  0.4    CHANGE SINCE PRIOR LEFT HIP EXAMINATION:  Interval increase in bone mineral density of 4%, statistically significant    RIGHT HIP ANALYSIS:    Right femoral neck BMD (g/cm2):  0.730  T-score:  -1.1  Z-score:  0.9    CHANGE SINCE PRIOR RIGHT HIP EXAMINATION:  No comparison studies are available.    LEFT FOREARM (1/3 RADIUS) ANALYSIS:  Left forearm BMD (g/cm2):  0.480  T-score:  -3.6  Z-score:  -1.2    ADDITIONAL FINDINGS:  No significant additional findings.      Note:  The International Society for Clinical Densitometry (ISCD) Official Positions state that osteoporosis in perimenopausal and postmenopausal women and in men age 50 and older may be diagnosed if the T-score of the lumbar spine, total hip, or femoral neck is -2.5 or less.  Hip classification is based on the reported BMD measurements of the femoral neck and total hip whichever is lower.  If bilateral hip measurements are obtained, Official Positions state that there is insufficient evidence to recommend mean readings.      WHO CLASSIFICATION: The T-score compares the patient's BMD to the average BMD of a young adult.  The criteria below are from the World Health Organization:  Normal: T-score -1.0 or above  Osteopenia/low bone mass: T-score -1.1 to -2.4  Osteoporosis: T-score -2.5 or lower  Severe or established osteoporosis: T-score -2.5 or lower plus fragility fracture  Exam End: 03/02/23 12:35 PM    Specimen Collected: 03/03/23  1:15 PM Last Resulted: 03/03/23  2:24 PM   Received From: [facility]  Result Received: 04/28/24 12:04 PM     I personally reviewed the patient's outside records from [facility] EMR and Care Everywhere. Summary of pertinent findings in HPI.    Impression / Plan     1.  Graves' disease:  History of poorly controlled thyrotoxicosis due to  nonadherence to methimazole since 2011.  Admitted to the hospital with A-fib due to thyrotoxicosis last September.  Discharged from the hospital on methimazole 30 mg/propranolol ER 60 mg daily on 9/18.    Following discharge from the hospital became adherent to methimazole.  Dose of methimazole was reduced over the time currently on 15 mg daily since October 2024..  Methimazole well-tolerated no side effects.    Clinically euthyroid.    Labs today: Free T4 borderline low 0.89, total T3 normal 125.    We rediscussed today the different options of treating Graves' disease she would like to continue with methimazole lowest needed dose and if the medications keep her in euthyroid status would like to continue with it.    Plan:  -Reduce methimazole dose down to 12.5 mg daily.  -To get TFTs in 1 month and then in 3 months prior to the next visit.    2.  Thyroid eye disease:  She missed her appointment with Dr. Hazel back in November 2024 due to the sickness of her boyfriend at that time.    Plan  -To start using artificial tears as needed.  -To contact the ophthalmology clinic to reschedule her appointment.      3. Osteoporosis:  Localized osteoporosis in the left forearm with osteopenia in bilateral hips consistent with age-related bone loss that worsened by thyrotoxicosis.  No history of fragility fracture.  Risk factors include: Postmenopausal, untreated thyrotoxicosis for years, family history of osteoporosis, low calcium intake.    I re-discussed with her today regarding considering starting treatment for osteoporosis to reduce the risk of getting fragility fracture in the future.  She has history of GERD was on omeprazole in the past also was complaining of dysphagia today..  With controlling thyrotoxicosis I am anticipating her PMD will improve however given the severity of the osteoporosis initiating osteoporosis specific treatment is indicated.  We discussed today about considering starting yearly Reclast  injection.  Eventually she agreed to start Reclast therapy.  We went over the possible side effect that could be associated with Reclast close including risk of hypocalcemia risk of getting flulike symptoms following receiving the injection, joints pain, dizziness, risk of allergic reaction, small risk of ONJ and small risk of atypical femur fracture especially with prolonged use.  She showed good understanding of the possible side effects and agreed to start on Reclast therapy.    Plan:  -Continue vitamin D supplements.  -Continue calcium supplements.  -Therapy plan is placed for receiving Reclast and advised to contact the infusion clinic to make an appointment.              Test and/or medications prescribed today:  Orders Placed This Encounter   Procedures     TSH     T3 total     T4 free     Basic metabolic panel     Vitamin D Deficiency     Speech Therapy  Referral         Follow up: 3 months with labs      CARLOS Spears  Endocrinology, Diabetes and Metabolism  [facility]    58 minutes spent on the date of the encounter doing chart review, history and exam, documentation and further activities per the note  The longitudinal plan of care for the diagnosis(es)/condition(s) as documented were addressed during this visit. Due to the added complexity in care, I will continue to support Monique in the subsequent management and with ongoing continuity of care.    Note: Chart documentation done in part with Dragon Voice Recognition software. Although reviewed after completion, some word and grammatical errors may remain.  Please consider this when interpreting information in this chart        Again, thank you for allowing me to participate in the care of your patient.      Sincerely,    CARLOS Spears

## 2025-04-04 NOTE — NURSING NOTE
Chief Complaint   Patient presents with    Labs Only     Labs drawn via  by RN in lab.      Labs collected from venipuncture by RN.     Jacque Hazel RN

## 2025-04-04 NOTE — PROGRESS NOTES
Endocrinology Clinic Visit 4/4/2025    NAME:  Monique Gupta  PCP:  Gabriella Cuenca  MRN:  [id number]  Reason for Consult: For follow-up osteoporosis and history of Graves' disease  Requesting Provider:  No ref. provider found    Chief Complaint     Chief Complaint   Patient presents with    Thyroid Disease       History of Present Illness     Monique Gupta is a 75 year old female who is seen in clinic for follow follow-up for history of Graves' disease and osteoporosis.  She has background history of osteoporosis, COPD, fibromyalgia, myofascial pain syndrome Graves' disease and A-fib with RVR .    Last visit was on 10/21/2024.    History of Graves' disease:  She was diagnosed with Graves' disease in 2011 since the time of the diagnosis in [place] her thyrotoxicosis has been not controlled due to nonadherence to the medications.  For the last few years she has been prescribed different doses of methimazole ranging between 10-60 mg.  She stopped taking methimazole completely in 2021 however developed recurrence of thyrotoxicosis restarted back on methimazole by her previous endocrinologist Dr. Peres in [place].  During her last visit with endocrinology in [place] on: She was placed on methimazole 10 mg twice daily and propranolol 10 mg 4 times a day as needed.  At that time she had concerns about weight gain associated with methimazole use.  At that time they discussed with her considering thyroidectomy given multiple episodes of recurrence and challenge of achieving a euthyroid status.     During the admission to the hospital:  9/16/2024: TSH<0.01, free T45.20  9/18/2024: Free T4 4.5 Total T3 elevated 268     Converted to sinus rhythm with diltiazem.  Discharge from the hospital on:  Propranolol ER 60 mg daily.  Methimazole 30 mg daily.    10/21/2024 visit: Was on methimazole 30 mg daily propranolol ER 60 mg daily with good adherence denied thyrotoxicosis symptoms.  Endorsed ongoing dysphagia  for years.  Discussed the different options of treating thyrotoxicosis due to Graves' disease she decided she would like to continue with antithyroid medications.  Continued on the same doses of methimazole and propranolol.    10/21/2024: Free T4 normalized 1.51, total T3 normalized 138.  Advised to reduce methimazole dose down to 15 mg daily and to get repeat lab test in 1 month however she did not get it done.    On assessment today:  Stated she takes methimazole 15 mg daily since end of October 2024  Adherence: no missed doses   No Palpitation, No heat intolerance, no excessive sweating, has ongoing tremor , has increased anxiety/irritability related it to the sickness of her BF who has lung cancer ,has ongoing   insomnia for her whole life , no weight loss, no diarrhea.   No Fatigue, no cold intolerance, has weight gain of 13 Ib since the last visit , no constipation, no excessive skin dryness,has hair loss related it to the stress,.  She did not have this video swallowing study or appointment with SLP was ordered on the last visit.  Appointment with ophthalmology Dr. Hazel on 11/12/2024 was canceled.  Compression symptoms: has ongoing dysphagia with solid food needs to drink after eating to push food down , no dyspnea , no change in voice   Ocular symptoms: cancelled the appointment with ophthalmology because her BF had session of chemotherapy on that day but still complains of FB sensation and dryness with itchiness, gets blurry vision sometimes , not using artificial tears      TSH <0.008 (L) 02/28/2023 09:33 AM  TSH 9.072 (H) 10/14/2022 08:55 AM  TSH <0.008 (L) 06/24/2022 09:13 AM  FREET4 2.82 (H) 02/28/2023 09:33 AM  FREET4 0.58 (L) 10/14/2022 08:55 AM  FREET4 1.70 (H) 06/24/2022 09:13 AM   TRAB 23.20 (H) 02/28/2023 09:33 AM   9/16/2024: TSH<0.01, free T45.20  9/18/2024: Free T4 4.5 Total T3 elevated 268  10/21/2024: Total T3 normal 138, free T4 normal 1.51.     US thyroid on Sept 20, 2019. Enlarged.    Right Lobe: 5.7 x 2.2 x 2.3 cm. Diffuse heterogeneity and prominent blood flow without discrete nodule. Left Lobe: 5.7 x 1.8 x 2.5 cm. Diffuse heterogeneity and prominent blood flow without discrete nodule. Thyroid isthmus measures 5 mm.      History of osteoporosis:  DEXA bone scan on 3/2/2023:  Osteoporosis left forearm T-score -3.6.  Osteopenia right femoral neck T-score -1.1, osteopenia left total hip T-score -1.2.  Normal BMD lumbar spine.  History of fractures: no   Vitamin D supplements: Started on vitamin D 1000 units daily on the last visit and started on calcium carbonate elemental 600 mg twice daily. And takes calcium twice daily   Calcium intake: no milk , no yogurt , no ice-cream , eats some cheese  Parental history of osteoporosis: Mother at age of 90 years had hip fracture   Steroid use: no   History of gastric bypass/malabsorption: no   History of rheumatoid arthritis: no   Alcohol: intake: none  Smoking : no  LMP:  at age 57 years old  GERD: yes she takes Omperazole   Last dental visit: was September 2024 no planned procedure .     Labs:  10/21/2024: Measured calcium 9.7, creatinine 0.79, PTH normal 40, phosphorus normal at 3.3, 25-hydroxy vitamin D3 61.    Discussed on the last visit considering starting Reclast therapy given the severity of osteoporosis however stated would like to think about it.  Today she agreed to start on Reclast therapy.        Problem List     Patient Active Problem List   Diagnosis    Age-related osteoporosis without current pathological fracture    Anorexia    Arthralgia of multiple sites    Chronic obstructive asthma (H)    Chronic tension-type headache    Dyslipidemia    Eating disorder    Fibromyalgia    Graves disease    History of hepatitis C    Myofascial pain syndrome    Vitamin D deficiency    Chronic left-sided low back pain with left-sided sciatica    Hip pain, left    Hyperthyroidism    Atrial fibrillation with RVR (H)        Medications     Current  Outpatient Medications   Medication Sig Dispense Refill    albuterol (PROAIR HFA/PROVENTIL HFA/VENTOLIN HFA) 108 (90 Base) MCG/ACT inhaler Inhale 2 puffs into the lungs every 4 hours as needed for shortness of breath or wheezing. 18 g 2    ALPRAZolam (XANAX) 0.25 MG tablet Take 0.25 mg by mouth nightly as needed      atenolol (TENORMIN) 25 MG tablet Take 1 tablet (25 mg) by mouth daily. 90 tablet 1    calcium carbonate (OS-JENNIFER) 1500 (600 Ca) MG tablet Take 1 tablet (600 mg) by mouth 2 times daily (with meals). 180 tablet 3    dextran 70-hypromellose (TEARS NATURALE FREE PF) 0.1-0.3 % ophthalmic solution Place 1 drop into both eyes daily as needed (itchiness). 36 each 3    doxycycline hyclate (VIBRAMYCIN) 100 MG capsule take 1 capsule (100 mg) by mouth twice a day for 7 days.      fluticasone-salmeterol (ADVAIR) 500-50 MCG/ACT inhaler Inhale 1 puff into the lungs 2 times daily. 60 each 1    gabapentin (NEURONTIN) 300 MG capsule Take 1 capsule (300 mg) by mouth 3 times daily. 90 capsule 1    ibuprofen (ADVIL/MOTRIN) 600 MG tablet Take 1 tablet (600 mg) by mouth every 8 hours as needed for moderate pain. 90 tablet 0    ipratropium - albuterol 0.5 mg/2.5 mg/3 mL (DUONEB) 0.5-2.5 (3) MG/3ML neb solution Take 1 vial (3 mLs) by nebulization every 6 hours as needed for shortness of breath, wheezing or cough 90 mL 0    methimazole (TAPAZOLE) 5 MG tablet Take 3 tablets (15 mg) by mouth daily. 270 tablet 2    omeprazole (PRILOSEC) 20 MG DR capsule Take 1 capsule (20 mg) by mouth daily. 90 capsule 0    Vitamin D3 (CHOLECALCIFEROL) 25 mcg (1000 units) tablet Take 1 tablet (25 mcg) by mouth daily. 90 tablet 3     No current facility-administered medications for this visit.        Allergies     Allergies   Allergen Reactions    Hydrocodone-Acetaminophen Itching    Penicillins Hives and Nausea and Vomiting     CONV. REACTION:Hives, CONV. REACTION:Vomiting    Sulfa Antibiotics Hives and Nausea and Vomiting     CONV. REACTION:Hives,  CONV. REACTION:Vomiting       Medical / Surgical History     No past medical history on file.  No past surgical history on file.    Social History     Social History     Socioeconomic History    Marital status:      Spouse name: Not on file    Number of children: Not on file    Years of education: Not on file    Highest education level: Not on file   Occupational History    Not on file   Tobacco Use    Smoking status: Never     Passive exposure: Never    Smokeless tobacco: Never   Vaping Use    Vaping status: Never Used   Substance and Sexual Activity    Alcohol use: Not on file    Drug use: Not on file    Sexual activity: Not on file   Other Topics Concern    Not on file   Social History Narrative    Not on file     Social Drivers of Health     Financial Resource Strain: Low Risk  (5/23/2024)    Financial Resource Strain     Within the past 12 months, have you or your family members you live with been unable to get utilities (heat, electricity) when it was really needed?: No   Food Insecurity: Low Risk  (5/23/2024)    Food Insecurity     Within the past 12 months, did you worry that your food would run out before you got money to buy more?: No     Within the past 12 months, did the food you bought just not last and you didn t have money to get more?: No   Transportation Needs: Low Risk  (5/23/2024)    Transportation Needs     Within the past 12 months, has lack of transportation kept you from medical appointments, getting your medicines, non-medical meetings or appointments, work, or from getting things that you need?: No   Physical Activity: Not on file   Stress: Not on file   Social Connections: Unknown (2/21/2024)    Received [facility]    Social Connections     In the past 3 months, do you feel that you lack companionship or social support?: Not on file   Interpersonal Safety: Low Risk  (5/23/2024)    Interpersonal Safety     Do you feel physically and emotionally safe where you currently  "live?: Yes     Within the past 12 months, have you been hit, slapped, kicked or otherwise physically hurt by someone?: No     Within the past 12 months, have you been humiliated or emotionally abused in other ways by your partner or ex-partner?: No   Housing Stability: Low Risk  (5/23/2024)    Housing Stability     Do you have housing? : Yes     Are you worried about losing your housing?: No       Family History     No family history on file.    ROS     12 ROS completed, pertinent positive and negative in HPI    Physical Exam   /75   Pulse 80   Ht 1.6 m (5' 3\")   Wt 76.2 kg (168 lb)   SpO2 99%   BMI 29.76 kg/m       General: Comfortable, no obvious distress, normal body habitus  HENT: Atraumatic, mild diffuse thyromegaly no palpable nodules no cervical lymphadenopathy.  Eyes: Upper eyelids puffiness, no redness, no significant proptosis, no ophthalmoplegia.  No induced diplopia.  CV: normal rate.   Resp:  good effort, no evidence of loud wheezing   Skin: No rashes, lesions, or subcutaneous nodules on exposed skin.   Psych: Alert and oriented x 3. Appropriate affect, good insight  Musculoskeletal: Appropriate muscle bulk   Neuro: Moves all four extremities. No focal deficits on limited exam.  Bilateral hands tremor, normal reflexes.    Labs/Imaging     Pertinent Labs were reviewed and discussed briefly.  Radiology Results were  reviewed and discussed briefly.    Summary of recent findings:   No results found for: \"A1C\"    TSH   Date Value Ref Range Status   09/16/2024 <0.01 (L) 0.30 - 4.20 uIU/mL Final     Free T4   Date Value Ref Range Status   04/04/2025 0.89 (L) 0.90 - 1.70 ng/dL Final   10/21/2024 1.51 0.90 - 1.70 ng/dL Final   09/18/2024 4.50 (H) 0.90 - 1.70 ng/dL Final   09/16/2024 5.20 (H) 0.90 - 1.70 ng/dL Final       Creatinine   Date Value Ref Range Status   10/21/2024 0.79 0.51 - 0.95 mg/dL Final      Latest Ref Rng 9/16/2024  7:42 PM 9/18/2024  6:28 AM 10/21/2024  12:15 PM 4/4/2025  9:17 AM "   ENDO THYROID LABS-[facility]        TSH 0.30 - 4.20 uIU/mL <0.01 (L)       Triiodothyronine (T3) 85 - 202 ng/dL  268 (H)  138  125    FREE T4 0.90 - 1.70 ng/dL 5.20 (H)  4.50 (H)  1.51  0.89 (L)       Legend:  (L) Low  (H) High     Latest Ref Rng 10/21/2024  12:15 PM   ENDO CALCIUM LABS-[facility]     25 OH Vit D total 20 - 75 ug/L <66    25 OH Vit D2 ug/L <5    25 OH Vit D3 ug/L 61    Albumin 3.5 - 5.2 g/dL    Alkaline Phosphatase 40 - 150 U/L    Calcium 8.8 - 10.4 mg/dL 9.7    Urea Nitrogen 8.0 - 23.0 mg/dL 11.9    Creatinine 0.51 - 0.95 mg/dL 0.79    Magnesium 1.7 - 2.3 mg/dL    Parathyroid Hormone Intact 15 - 65 pg/mL 40      LUMBAR SPINE TWO TO THREE VIEWS   5/23/2024 11:55 AM      HISTORY: Left lumbar radiculopathy.     COMPARISON: None.                                                                      IMPRESSION: No fracture is identified. Moderate degenerative disc  disease at L2-L3. Background of mild degenerative disc disease.  Moderate lower lumbar spine facet arthropathy. Multiple grade 1  spondylolisthesis.    EXAMINATION:  DEXA BONE DENSITY SPINE AND HIP    DATE/TIME:  3/2/2023 10:35 AM    HISTORY:  Hyperthyroidism.    COMPARISON:  January 15, 2020.    TECHNIQUE:  Dual-energy X-ray absorptiometry (DXA) was performed on a HoloYapta central device.  Bone mineral density (BMD) measurements were obtained.  The following information on each individual patient can be found on the [facility] Physician Portal or on the [facility] PACS website ([email or web address]).      SPINE ANALYSIS (L1-L4):    Average lumbar BMD (g/cm2):  1.112  T-score:  0.6  Z-score:  2.9    CHANGE SINCE PRIOR SPINE EXAMINATION:  No comparison studies are available.    LEFT HIP ANALYSIS:    Left total hip BMD (g/cm2):  0.792  T-score:  -1.2  Z-score:  0.4    CHANGE SINCE PRIOR LEFT HIP EXAMINATION:  Interval increase in bone mineral density of 4%, statistically significant    RIGHT HIP ANALYSIS:    Right femoral neck BMD (g/cm2):  0.730  T-score:   -1.1  Z-score:  0.9    CHANGE SINCE PRIOR RIGHT HIP EXAMINATION:  No comparison studies are available.    LEFT FOREARM (1/3 RADIUS) ANALYSIS:  Left forearm BMD (g/cm2):  0.480  T-score:  -3.6  Z-score:  -1.2    ADDITIONAL FINDINGS:  No significant additional findings.      Note:  The International Society for Clinical Densitometry (ISCD) Official Positions state that osteoporosis in perimenopausal and postmenopausal women and in men age 50 and older may be diagnosed if the T-score of the lumbar spine, total hip, or femoral neck is -2.5 or less.  Hip classification is based on the reported BMD measurements of the femoral neck and total hip whichever is lower.  If bilateral hip measurements are obtained, Official Positions state that there is insufficient evidence to recommend mean readings.      WHO CLASSIFICATION: The T-score compares the patient's BMD to the average BMD of a young adult.  The criteria below are from the World Health Organization:  Normal: T-score -1.0 or above  Osteopenia/low bone mass: T-score -1.1 to -2.4  Osteoporosis: T-score -2.5 or lower  Severe or established osteoporosis: T-score -2.5 or lower plus fragility fracture  Exam End: 03/02/23 12:35 PM    Specimen Collected: 03/03/23  1:15 PM Last Resulted: 03/03/23  2:24 PM   [facility] From[facility]  Result Received: 04/28/24 12:04 PM     I personally reviewed the patient's outside records from [facility] EMR and Care Everywhere. Summary of pertinent findings in HPI.    Impression / Plan     1.  Graves' disease:  History of poorly controlled thyrotoxicosis due to nonadherence to methimazole since 2011.  Admitted to the hospital with A-fib due to thyrotoxicosis last September.  Discharged from the hospital on methimazole 30 mg/propranolol ER 60 mg daily on 9/18.    Following discharge from the hospital became adherent to methimazole.  Dose of methimazole was reduced over the time currently on 15 mg daily since October 2024..  Methimazole  well-tolerated no side effects.    Clinically euthyroid.    Labs today: Free T4 borderline low 0.89, total T3 normal 125.    We rediscussed today the different options of treating Graves' disease she would like to continue with methimazole lowest needed dose and if the medications keep her in euthyroid status would like to continue with it.    Plan:  -Reduce methimazole dose down to 12.5 mg daily.  -To get TFTs in 1 month and then in 3 months prior to the next visit.    2.  Thyroid eye disease:  She missed her appointment with Dr. Hazel back in November 2024 due to the sickness of her boyfriend at that time.    Plan  -To start using artificial tears as needed.  -To contact the ophthalmology clinic to reschedule her appointment.      3. Osteoporosis:  Localized osteoporosis in the left forearm with osteopenia in bilateral hips consistent with age-related bone loss that worsened by thyrotoxicosis.  No history of fragility fracture.  Risk factors include: Postmenopausal, untreated thyrotoxicosis for years, family history of osteoporosis, low calcium intake.    I re-discussed with her today regarding considering starting treatment for osteoporosis to reduce the risk of getting fragility fracture in the future.  She has history of GERD was on omeprazole in the past also was complaining of dysphagia today..  With controlling thyrotoxicosis I am anticipating her PMD will improve however given the severity of the osteoporosis initiating osteoporosis specific treatment is indicated.  We discussed today about considering starting yearly Reclast injection.  Eventually she agreed to start Reclast therapy.  We went over the possible side effect that could be associated with Reclast close including risk of hypocalcemia risk of getting flulike symptoms following receiving the injection, joints pain, dizziness, risk of allergic reaction, small risk of ONJ and small risk of atypical femur fracture especially with prolonged use.  She  showed good understanding of the possible side effects and agreed to start on Reclast therapy.    Plan:  -Continue vitamin D supplements.  -Continue calcium supplements.  -Therapy plan is placed for receiving Reclast and advised to contact the infusion clinic to make an appointment.    4.  Dysphagia:  Was referred previously to SLP however did not get an appointment.  New referral is placed today.          Test and/or medications prescribed today:  Orders Placed This Encounter   Procedures    TSH    T3 total    T4 free    Basic metabolic panel    Vitamin D Deficiency    Speech Therapy  Referral         Follow up: 3 months with labs      CARLOS Spears  Endocrinology, Diabetes and Metabolism  [facility]    58 minutes spent on the date of the encounter doing chart review, history and exam, documentation and further activities per the note  The longitudinal plan of care for the diagnosis(es)/condition(s) as documented were addressed during this visit. Due to the added complexity in care, I will continue to support Monique in the subsequent management and with ongoing continuity of care.    Note: Chart documentation done in part with Dragon Voice Recognition software. Although reviewed after completion, some word and grammatical errors may remain.  Please consider this when interpreting information in this chart

## 2025-04-05 RX ORDER — METHIMAZOLE 5 MG/1
12.5 TABLET ORAL DAILY
Qty: 270 TABLET | Refills: 3 | Status: SHIPPED | OUTPATIENT
Start: 2025-04-05

## 2025-04-29 ENCOUNTER — MYC REFILL (OUTPATIENT)
Dept: FAMILY MEDICINE | Facility: CLINIC | Age: 75
End: 2025-04-29

## 2025-04-29 ENCOUNTER — MYC REFILL (OUTPATIENT)
Dept: ENDOCRINOLOGY | Facility: CLINIC | Age: 75
End: 2025-04-29

## 2025-04-29 DIAGNOSIS — M25.552 HIP PAIN, LEFT: ICD-10-CM

## 2025-04-29 DIAGNOSIS — G89.29 CHRONIC PAIN OF LEFT KNEE: ICD-10-CM

## 2025-04-29 DIAGNOSIS — E05.00 GRAVES DISEASE: ICD-10-CM

## 2025-04-29 DIAGNOSIS — M25.562 CHRONIC PAIN OF LEFT KNEE: ICD-10-CM

## 2025-04-29 DIAGNOSIS — M79.18 MYOFASCIAL PAIN SYNDROME: Chronic | ICD-10-CM

## 2025-04-29 DIAGNOSIS — M81.0 AGE-RELATED OSTEOPOROSIS WITHOUT CURRENT PATHOLOGICAL FRACTURE: ICD-10-CM

## 2025-04-30 RX ORDER — IBUPROFEN 600 MG/1
600 TABLET, FILM COATED ORAL EVERY 8 HOURS PRN
Qty: 90 TABLET | Refills: 0 | Status: SHIPPED | OUTPATIENT
Start: 2025-04-30

## 2025-04-30 RX ORDER — VITAMIN B COMPLEX
1 TABLET ORAL DAILY
Qty: 90 TABLET | Refills: 3 | Status: SHIPPED | OUTPATIENT
Start: 2025-04-30

## 2025-04-30 RX ORDER — ALPRAZOLAM 0.25 MG
0.25 TABLET ORAL
OUTPATIENT
Start: 2025-04-30

## 2025-04-30 RX ORDER — GABAPENTIN 300 MG/1
300 CAPSULE ORAL 3 TIMES DAILY
Qty: 90 CAPSULE | Refills: 1 | Status: SHIPPED | OUTPATIENT
Start: 2025-04-30

## 2025-04-30 RX ORDER — ATENOLOL 25 MG/1
25 TABLET ORAL DAILY
Qty: 90 TABLET | Refills: 0 | Status: SHIPPED | OUTPATIENT
Start: 2025-04-30

## 2025-04-30 NOTE — TELEPHONE ENCOUNTER
Called patient and relayed provider message. Patient verbalized understanding.    Hermila Haines

## 2025-04-30 NOTE — TELEPHONE ENCOUNTER
Called patient and she states she reported this medication to provider when she transferred care with pcp. States she used to take this medication in [place] as needed--she would like this refilled without making an appt because she has discussed this with you before--please advise.

## 2025-04-30 NOTE — TELEPHONE ENCOUNTER
I had discussed with her previously that I would not be refilling her Xanax as she was receiving it in [place].  Reviewed [facility], shows that she has not received any Xanax since at least 2024 here in [place].  I will not be continuing this medication.  If patient disagrees with plan she is welcome to establish with a different provider.    Thank you,  Gabriella Cuenca MD MPH

## 2025-04-30 NOTE — TELEPHONE ENCOUNTER
I have not prescribed Xanax and will not be refilling this. Other 2 scripts refilled. Thank you, Gabriella Cuenca MD MPH

## 2025-05-07 ENCOUNTER — MYC REFILL (OUTPATIENT)
Dept: ENDOCRINOLOGY | Facility: CLINIC | Age: 75
End: 2025-05-07
Payer: MEDICARE

## 2025-05-07 DIAGNOSIS — M81.0 AGE-RELATED OSTEOPOROSIS WITHOUT CURRENT PATHOLOGICAL FRACTURE: ICD-10-CM

## 2025-05-15 ENCOUNTER — THERAPY VISIT (OUTPATIENT)
Dept: SPEECH THERAPY | Facility: CLINIC | Age: 75
End: 2025-05-15
Attending: STUDENT IN AN ORGANIZED HEALTH CARE EDUCATION/TRAINING PROGRAM
Payer: MEDICARE

## 2025-05-15 DIAGNOSIS — R13.10 DYSPHAGIA, UNSPECIFIED TYPE: ICD-10-CM

## 2025-05-15 PROCEDURE — 92610 EVALUATE SWALLOWING FUNCTION: CPT | Mod: GN | Performed by: SPEECH-LANGUAGE PATHOLOGIST

## 2025-05-15 PROCEDURE — 92526 ORAL FUNCTION THERAPY: CPT | Mod: GN | Performed by: SPEECH-LANGUAGE PATHOLOGIST
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
## 2025-06-08 ENCOUNTER — HEALTH MAINTENANCE LETTER (OUTPATIENT)
Age: 75
End: 2025-06-08

## 2025-06-09 DIAGNOSIS — J45.909 ASTHMA: Primary | ICD-10-CM

## 2025-06-10 NOTE — TELEPHONE ENCOUNTER
FUTURE VISIT INFORMATION      SURGERY INFORMATION:  Date: 25   Location: [facility] GI  Surgeon:  eSth Medina MD   Anesthesia Type:  MAC   Procedure: Colonoscopy     RECORDS REQUESTED FROM:       Primary Care Provider: Kiana Blackwell MD    Pertinent Medical History:Allergies: Hydrocodone-acetaminophen, Penicillins, Sulfa Antibiotics   Chronic obstructive asthma (H), Graves disease, Hyperthyroidism, Atrial fibrillation with RVR, Anorexia    Most recent EKG+ Tracin24     Most recent ECHO:10/25/22    Most recent Coronary Angiogram: 3/27/24

## 2025-06-16 ENCOUNTER — ANCILLARY PROCEDURE (OUTPATIENT)
Dept: GENERAL RADIOLOGY | Facility: CLINIC | Age: 75
End: 2025-06-16
Attending: INTERNAL MEDICINE
Payer: MEDICARE

## 2025-06-16 ENCOUNTER — OFFICE VISIT (OUTPATIENT)
Dept: PULMONOLOGY | Facility: CLINIC | Age: 75
End: 2025-06-16
Payer: MEDICARE

## 2025-06-16 VITALS
OXYGEN SATURATION: 97 % | SYSTOLIC BLOOD PRESSURE: 101 MMHG | WEIGHT: 164 LBS | HEART RATE: 70 BPM | HEIGHT: 63 IN | DIASTOLIC BLOOD PRESSURE: 70 MMHG | BODY MASS INDEX: 29.06 KG/M2

## 2025-06-16 DIAGNOSIS — J45.909 ASTHMA: ICD-10-CM

## 2025-06-16 DIAGNOSIS — J45.40 MODERATE PERSISTENT ASTHMA WITHOUT COMPLICATION: Primary | ICD-10-CM

## 2025-06-16 PROCEDURE — 71046 X-RAY EXAM CHEST 2 VIEWS: CPT | Mod: GC | Performed by: RADIOLOGY

## 2025-06-16 RX ORDER — FLUTICASONE PROPIONATE AND SALMETEROL 500; 50 UG/1; UG/1
1 POWDER RESPIRATORY (INHALATION) EVERY 12 HOURS
Qty: 3 EACH | Refills: 3 | Status: SHIPPED | OUTPATIENT
Start: 2025-06-16 | End: 2026-06-11

## 2025-06-16 ASSESSMENT — ASTHMA QUESTIONNAIRES
QUESTION_1 LAST FOUR WEEKS HOW MUCH OF THE TIME DID YOUR ASTHMA KEEP YOU FROM GETTING AS MUCH DONE AT WORK, SCHOOL OR AT HOME: SOME OF THE TIME
QUESTION_3 LAST FOUR WEEKS HOW OFTEN DID YOUR ASTHMA SYMPTOMS (WHEEZING, COUGHING, SHORTNESS OF BREATH, CHEST TIGHTNESS OR PAIN) WAKE YOU UP AT NIGHT OR EARLIER THAN USUAL IN THE MORNING: ONCE A WEEK
QUESTION_2 LAST FOUR WEEKS HOW OFTEN HAVE YOU HAD SHORTNESS OF BREATH: MORE THAN ONCE A DAY
ACT_TOTALSCORE: 14
QUESTION_4 LAST FOUR WEEKS HOW OFTEN HAVE YOU USED YOUR RESCUE INHALER OR NEBULIZER MEDICATION (SUCH AS ALBUTEROL): ONCE A WEEK OR LESS
ACT_TOTALSCORE: 14
QUESTION_5 LAST FOUR WEEKS HOW WOULD YOU RATE YOUR ASTHMA CONTROL: SOMEWHAT CONTROLLED

## 2025-06-16 ASSESSMENT — PAIN SCALES - GENERAL: PAINLEVEL_OUTOF10: NO PAIN (0)

## 2025-06-16 NOTE — NURSING NOTE
"Chief Complaint   Patient presents with    New Patient     New consult for asthma/cough.     She requests these members of her care team be copied on today's visit information:  PCP: Kiana Blackwell    Vitals:    06/16/25 0936   BP: 101/70   BP Location: Left arm   Patient Position: Sitting   Cuff Size: Adult Regular   Pulse: 70   SpO2: 97%   Weight: 74.4 kg (164 lb)   Height: 1.6 m (5' 3\")     Body mass index is 29.05 kg/m .    Medications were reconciled.        Etelvina Beck CMA        "
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
## 2025-06-17 LAB
DLCOUNC-%PRED-PRE: 86 %
DLCOUNC-PRE: 15.78 ML/MIN/MMHG
DLCOUNC-PRED: 18.27 ML/MIN/MMHG
ERV-%PRED-PRE: 66 %
ERV-PRE: 0.59 L
ERV-PRED: 0.9 L
EXPTIME-PRE: 6.62 SEC
FEF2575-%PRED-POST: 121 %
FEF2575-%PRED-PRE: 85 %
FEF2575-POST: 1.93 L/SEC
FEF2575-PRE: 1.36 L/SEC
FEF2575-PRED: 1.58 L/SEC
FEFMAX-%PRED-PRE: 104 %
FEFMAX-PRE: 5.32 L/SEC
FEFMAX-PRED: 5.11 L/SEC
FEV1-%PRED-PRE: 92 %
FEV1-PRE: 1.72 L
FEV1FEV6-PRE: 76 %
FEV1FEV6-PRED: 78 %
FEV1FVC-PRE: 75 %
FEV1FVC-PRED: 78 %
FEV1SVC-PRE: 80 %
FEV1SVC-PRED: 63 %
FIFMAX-PRE: 3.19 L/SEC
FRCPLETH-%PRED-PRE: 82 %
FRCPLETH-PRE: 2.33 L
FRCPLETH-PRED: 2.83 L
FVC-%PRED-PRE: 95 %
FVC-PRE: 2.29 L
FVC-PRED: 2.41 L
IC-%PRED-PRE: 87 %
IC-PRE: 1.57 L
IC-PRED: 1.79 L
RVPLETH-%PRED-PRE: 82 %
RVPLETH-PRE: 1.73 L
RVPLETH-PRED: 2.11 L
TLCPLETH-%PRED-PRE: 80 %
TLCPLETH-PRE: 3.9 L
TLCPLETH-PRED: 4.81 L
VA-%PRED-PRE: 80 %
VA-PRE: 3.54 L
VC-%PRED-PRE: 73 %
VC-PRE: 2.16 L
VC-PRED: 2.95 L

## 2025-06-21 SDOH — HEALTH STABILITY: PHYSICAL HEALTH: ON AVERAGE, HOW MANY MINUTES DO YOU ENGAGE IN EXERCISE AT THIS LEVEL?: 0 MIN

## 2025-06-21 SDOH — HEALTH STABILITY: PHYSICAL HEALTH: ON AVERAGE, HOW MANY DAYS PER WEEK DO YOU ENGAGE IN MODERATE TO STRENUOUS EXERCISE (LIKE A BRISK WALK)?: 0 DAYS

## 2025-06-21 ASSESSMENT — SOCIAL DETERMINANTS OF HEALTH (SDOH): HOW OFTEN DO YOU GET TOGETHER WITH FRIENDS OR RELATIVES?: NEVER

## 2025-06-23 ENCOUNTER — RESULTS FOLLOW-UP (OUTPATIENT)
Dept: INTERNAL MEDICINE | Facility: CLINIC | Age: 75
End: 2025-06-23

## 2025-06-23 ENCOUNTER — OFFICE VISIT (OUTPATIENT)
Dept: INTERNAL MEDICINE | Facility: CLINIC | Age: 75
End: 2025-06-23
Payer: MEDICARE

## 2025-06-23 ENCOUNTER — LAB (OUTPATIENT)
Dept: LAB | Facility: CLINIC | Age: 75
End: 2025-06-23
Payer: MEDICARE

## 2025-06-23 VITALS
DIASTOLIC BLOOD PRESSURE: 73 MMHG | TEMPERATURE: 98.2 F | HEIGHT: 63 IN | OXYGEN SATURATION: 98 % | SYSTOLIC BLOOD PRESSURE: 116 MMHG | BODY MASS INDEX: 29.23 KG/M2 | HEART RATE: 54 BPM | RESPIRATION RATE: 18 BRPM | WEIGHT: 165 LBS

## 2025-06-23 DIAGNOSIS — M25.552 HIP PAIN, LEFT: ICD-10-CM

## 2025-06-23 DIAGNOSIS — E05.00 GRAVES DISEASE: Chronic | ICD-10-CM

## 2025-06-23 DIAGNOSIS — R06.09 DYSPNEA ON EXERTION: ICD-10-CM

## 2025-06-23 DIAGNOSIS — R06.09 DYSPNEA ON EXERTION: Primary | ICD-10-CM

## 2025-06-23 DIAGNOSIS — M81.0 AGE-RELATED OSTEOPOROSIS WITHOUT CURRENT PATHOLOGICAL FRACTURE: ICD-10-CM

## 2025-06-23 DIAGNOSIS — Z23 NEED FOR VACCINATION: ICD-10-CM

## 2025-06-23 LAB
ALBUMIN SERPL BCG-MCNC: 4.3 G/DL (ref 3.5–5.2)
ALP SERPL-CCNC: 74 U/L (ref 40–150)
ALT SERPL W P-5'-P-CCNC: 10 U/L (ref 0–50)
ANION GAP SERPL CALCULATED.3IONS-SCNC: 10 MMOL/L (ref 7–15)
ANION GAP SERPL CALCULATED.3IONS-SCNC: 9 MMOL/L (ref 7–15)
AST SERPL W P-5'-P-CCNC: 18 U/L (ref 0–45)
BILIRUB SERPL-MCNC: 0.5 MG/DL
BUN SERPL-MCNC: 12.4 MG/DL (ref 8–23)
BUN SERPL-MCNC: 12.5 MG/DL (ref 8–23)
CALCIUM SERPL-MCNC: 9.5 MG/DL (ref 8.8–10.4)
CALCIUM SERPL-MCNC: 9.5 MG/DL (ref 8.8–10.4)
CHLORIDE SERPL-SCNC: 109 MMOL/L (ref 98–107)
CHLORIDE SERPL-SCNC: 109 MMOL/L (ref 98–107)
CHOLEST SERPL-MCNC: 205 MG/DL
CREAT SERPL-MCNC: 0.78 MG/DL (ref 0.51–0.95)
CREAT SERPL-MCNC: 0.81 MG/DL (ref 0.51–0.95)
EGFRCR SERPLBLD CKD-EPI 2021: 75 ML/MIN/1.73M2
EGFRCR SERPLBLD CKD-EPI 2021: 79 ML/MIN/1.73M2
ERYTHROCYTE [DISTWIDTH] IN BLOOD BY AUTOMATED COUNT: 12.4 % (ref 10–15)
FASTING STATUS PATIENT QL REPORTED: ABNORMAL
FASTING STATUS PATIENT QL REPORTED: ABNORMAL
GLUCOSE SERPL-MCNC: 107 MG/DL (ref 70–99)
GLUCOSE SERPL-MCNC: 108 MG/DL (ref 70–99)
HCO3 SERPL-SCNC: 23 MMOL/L (ref 22–29)
HCO3 SERPL-SCNC: 24 MMOL/L (ref 22–29)
HCT VFR BLD AUTO: 43.7 % (ref 35–47)
HDLC SERPL-MCNC: 61 MG/DL
HGB BLD-MCNC: 14.5 G/DL (ref 11.7–15.7)
LDLC SERPL CALC-MCNC: 112 MG/DL
MCH RBC QN AUTO: 29.1 PG (ref 26.5–33)
MCHC RBC AUTO-ENTMCNC: 33.2 G/DL (ref 31.5–36.5)
MCV RBC AUTO: 88 FL (ref 78–100)
NONHDLC SERPL-MCNC: 144 MG/DL
PLATELET # BLD AUTO: 239 10E3/UL (ref 150–450)
POTASSIUM SERPL-SCNC: 4.6 MMOL/L (ref 3.4–5.3)
POTASSIUM SERPL-SCNC: 4.6 MMOL/L (ref 3.4–5.3)
PROT SERPL-MCNC: 6.7 G/DL (ref 6.4–8.3)
RBC # BLD AUTO: 4.98 10E6/UL (ref 3.8–5.2)
SODIUM SERPL-SCNC: 142 MMOL/L (ref 135–145)
SODIUM SERPL-SCNC: 142 MMOL/L (ref 135–145)
T3 SERPL-MCNC: 180 NG/DL (ref 85–202)
T4 FREE SERPL-MCNC: 1.79 NG/DL (ref 0.9–1.7)
TRIGL SERPL-MCNC: 159 MG/DL
TSH SERPL DL<=0.005 MIU/L-ACNC: 0.01 UIU/ML (ref 0.3–4.2)
VIT D+METAB SERPL-MCNC: 49 NG/ML (ref 20–50)
WBC # BLD AUTO: 5.6 10E3/UL (ref 4–11)

## 2025-06-23 PROCEDURE — 84439 ASSAY OF FREE THYROXINE: CPT | Performed by: PATHOLOGY

## 2025-06-23 PROCEDURE — 84480 ASSAY TRIIODOTHYRONINE (T3): CPT | Performed by: STUDENT IN AN ORGANIZED HEALTH CARE EDUCATION/TRAINING PROGRAM

## 2025-06-23 PROCEDURE — 80061 LIPID PANEL: CPT | Performed by: PATHOLOGY

## 2025-06-23 PROCEDURE — 99000 SPECIMEN HANDLING OFFICE-LAB: CPT | Performed by: PATHOLOGY

## 2025-06-23 PROCEDURE — 80053 COMPREHEN METABOLIC PANEL: CPT | Performed by: PATHOLOGY

## 2025-06-23 PROCEDURE — 3049F LDL-C 100-129 MG/DL: CPT | Performed by: PATHOLOGY

## 2025-06-23 PROCEDURE — 82306 VITAMIN D 25 HYDROXY: CPT | Performed by: STUDENT IN AN ORGANIZED HEALTH CARE EDUCATION/TRAINING PROGRAM

## 2025-06-23 PROCEDURE — 85027 COMPLETE CBC AUTOMATED: CPT | Performed by: PATHOLOGY

## 2025-06-23 PROCEDURE — 84443 ASSAY THYROID STIM HORMONE: CPT | Performed by: PATHOLOGY

## 2025-06-23 PROCEDURE — 36415 COLL VENOUS BLD VENIPUNCTURE: CPT | Performed by: PATHOLOGY

## 2025-06-23 NOTE — PROGRESS NOTES
Preventive Care Visit  [facility]  Kiana Blackwell MD, Internal Medicine  Jun 23, 2025  {Provider  Link to SmartSet :516253}    {PROVIDER CHARTING PREFERENCE:633587}    Christine Amaya is a 75 year old, presenting for the following:  Physical and Establish Care        6/23/2025     8:03 AM   Additional Questions   Roomed by Viola BARR   Accompanied by N/A          HPI     Patient is here for evaluation of several concerns. She reports that she has been dealing with left hip pain for the past few years. She states that it is affecting her walk. Pain starts in the lower back, wraps around and goes to the knee. She states that pain is worse with trying to sit with left leg over the right knee. Pain is affecting her sleep. She also reports that it is painful for her to walk. She was in PT for this issue but had to stop when her boyfriend was diagnosed with lung cancer.   She is also having some issues with shortness of breath. She was seen by pulmonology and was advised to consider evaluation by cardiology.     Advance Care Planning  {The storyboard will display whether the patient has ACP docs on file. Hover over the Code section in the storyboard to access the ACP documents. :000897}  {(AWV REQUIRED) Advance Care Planning Reviewed:705588}        6/21/2025   General Health   How would you rate your overall physical health? (!) FAIR   Feel stress (tense, anxious, or unable to sleep) Very much   (!) STRESS CONCERN      6/21/2025   Nutrition   Diet: Regular (no restrictions)         6/21/2025   Exercise   Days per week of moderate/strenous exercise 0 days   Average minutes spent exercising at this level 0 min   (!) EXERCISE CONCERN      6/21/2025   Social Factors   Frequency of gathering with friends or relatives Never   Worry food won't last until get money to buy more No   Food not last or not have enough money for food? No   Do you have housing? (Housing is  defined as stable permanent housing and does not include staying outside in a car, in a tent, in an abandoned building, in an overnight shelter, or couch-surfing.) Yes   Are you worried about losing your housing? No   Lack of transportation? No   Unable to get utilities (heat,electricity)? No   (!) SOCIAL CONNECTIONS CONCERN      6/23/2025   Fall Risk   Gait Speed Test (Document in seconds) 3.47   Gait Speed Test Interpretation Less than or equal to 5.00 seconds - PASS          6/21/2025   Activities of Daily Living- Home Safety   Needs help with the following daily activites None of the above   Safety concerns in the home None of the above         6/21/2025   Dental   Dentist two times every year? Yes         6/21/2025   Hearing Screening   Hearing concerns? None of the above         6/21/2025   Driving Risk Screening   Patient/family members have concerns about driving No         6/21/2025   General Alertness/Fatigue Screening   Have you been more tired than usual lately? (!) YES         6/21/2025   Urinary Incontinence Screening   Bothered by leaking urine in past 6 months No         Today's PHQ-2 Score:       6/23/2025     8:11 AM   PHQ-2 ( 1999 Pfizer)   Q1: Little interest or pleasure in doing things 0   Q2: Feeling down, depressed or hopeless 0   PHQ-2 Score 0         6/21/2025   Substance Use   Alcohol more than 3/day or more than 7/wk No   Do you have a current opioid prescription? No   How severe/bad is pain from 1 to 10? 8/10   Do you use any other substances recreationally? No     Social History     Tobacco Use    Smoking status: Never     Passive exposure: Never    Smokeless tobacco: Never   Vaping Use    Vaping status: Never Used   Substance Use Topics    Alcohol use: Yes     Comment: Social    Drug use: Never     {Provider  If there are gaps in the social history shown above, please follow the link to update and then refresh the note Link to Social and Substance History :374677}      6/20/2024   LAST  FHS-7 RESULTS   1st degree relative breast or ovarian cancer Yes   Any relative bilateral breast cancer Yes   Any male have breast cancer No   Any ONE woman have BOTH breast AND ovarian cancer No   Any woman with breast cancer before 50yrs No   2 or more relatives with breast AND/OR ovarian cancer Yes   2 or more relatives with breast AND/OR bowel cancer Yes     {If any of the questions to the FHS7 are answered yes, consider referral for genetic counseling.    Additional indications for genetic referral include personal history of breast or ovarian cancer, genetic mutation in 1st degree relative which increases risk of breast cancer including BRCA1, BRCA2, JD, PALB 2, TP53, CHEK2, PTEN, CDH1, STK11 (per ACS) and/or 1st degree relative with history of pancreatic or high-risk prostate cancer (per NCCN):437121}   {Mammogram Decision Support (Optional):813195}    ASCVD Risk   The ASCVD Risk score (Ruthie GAMINO, et al., 2019) failed to calculate for the following reasons:    Cannot find a previous HDL lab    Cannot find a previous total cholesterol lab    {Link to Fracture Risk Assessment Tool (Optional):819271}    {Provider  REQUIRED FOR AWV Use the storyboard to review patient history, after sections have been marked as reviewed, refresh note to capture documentation:921665}  {Provider   REQUIRED AWV use this link to review and update sexual activity history  after section has been marked as reviewed, refresh note to capture documentation:563427}  Reviewed and updated as needed this visit by Provider                    {HISTORY OPTIONS (Optional):351566}  Current providers sharing in care for this patient include:  Patient Care Team:  Kiana Blackwell MD as PCP - General (Internal Medicine)  Zackary Owen MD as MD (Pulmonary Disease)  Gabriella Cuenca MD as Assigned PCP  Flori Holly MBBS as Assigned Endocrinology Provider  Zackary Owen MD as MD (Pulmonary Disease)    The following health maintenance  "items are reviewed in Epic and correct as of today:  Health Maintenance   Topic Date Due    LIPID  Never done    ADVANCE CARE PLANNING  Never done    HEPATITIS C SCREENING  Never done    ZOSTER VACCINE (1 of 2) Never done    HEPATITIS B VACCINE (3 of 3 - Risk 3-dose series) 03/25/2005    MEDICARE ANNUAL WELLNESS VISIT  Never done    COLORECTAL CANCER SCREENING  06/11/2024    COVID-19 VACCINE (4 - 2024-25 season) 09/01/2024    RSV VACCINE (1 - 1-dose 75+ series) Never done    ANNUAL REVIEW OF HM ORDERS  05/23/2025    INFLUENZA VACCINE (Season Ended) 09/01/2025    ASTHMA CONTROL TEST  12/16/2025    ASTHMA ACTION PLAN  06/16/2026    FALL RISK ASSESSMENT  06/23/2026    DIABETES SCREENING  10/21/2027    DTAP/TDAP/TD VACCINE (3 - Td or Tdap) 11/19/2029    DEXA  03/02/2038    PHQ-2 (once per calendar year)  Completed    PNEUMOCOCCAL VACCINE 50+ YEARS  Completed    HPV VACCINE  Aged Out    MENINGITIS VACCINE  Aged Out    MAMMO SCREENING  Discontinued       {ROS Picklists (Optional):258987}     Objective    Exam  /73 (BP Location: Right arm, Patient Position: Sitting, Cuff Size: Adult Regular)   Pulse 54   Temp 98.2  F (36.8  C)   Resp 18   Ht 1.6 m (5' 2.99\")   Wt 74.8 kg (165 lb)   SpO2 98%   BMI 29.24 kg/m     Estimated body mass index is 29.24 kg/m  as calculated from the following:    Height as of this encounter: 1.6 m (5' 2.99\").    Weight as of this encounter: 74.8 kg (165 lb).    Physical Exam  {Exam Choices (Optional):517937}  Gait and balance assessed per Gait Speed Test.  Result as above.  {Provider  The Mini-Cog is incomplete, use link to complete and refresh note Link to Mini-Cog :737230}       No data to display              {A Mini-Cog total score of 0-2 suggests the possibility of dementia, score of 3-5 suggests no dementia:746162}         Signed Electronically by: Kiana Blackwell MD  {Email feedback regarding this note to [email or web address]   :660204}  "

## 2025-06-23 NOTE — PATIENT INSTRUCTIONS
FODMAP diet:  [email or web address]/im/tool-low-fodmap-diet.pdf

## 2025-06-24 ENCOUNTER — PATIENT OUTREACH (OUTPATIENT)
Dept: CARE COORDINATION | Facility: CLINIC | Age: 75
End: 2025-06-24
Payer: MEDICARE

## 2025-06-25 NOTE — TELEPHONE ENCOUNTER
DIAGNOSIS: Hip pain, left [M25.552] / No Images / Kiana Blackwell MD in [facility] / Medicare    APPOINTMENT DATE: 7/8/25   NOTES STATUS DETAILS   OFFICE NOTE from referring provider Internal 6/23/25 - Kiana Blackwell MD    OFFICE NOTE from other specialist Internal 5/23/24 - Gabriella Cuenca MD    DISCHARGE REPORT from the ER Internal 9/16/24 - Saturnino Sandra MD    MEDICATION LIST Internal

## 2025-07-02 DIAGNOSIS — M25.552 LEFT HIP PAIN: Primary | ICD-10-CM

## 2025-07-02 DIAGNOSIS — K44.9 HIATAL HERNIA: ICD-10-CM

## 2025-07-03 RX ORDER — OMEPRAZOLE 20 MG/1
20 CAPSULE, DELAYED RELEASE ORAL DAILY
Qty: 90 CAPSULE | Refills: 1 | Status: SHIPPED | OUTPATIENT
Start: 2025-07-03

## 2025-07-07 ENCOUNTER — PATIENT OUTREACH (OUTPATIENT)
Dept: CARE COORDINATION | Facility: CLINIC | Age: 75
End: 2025-07-07
Payer: MEDICARE

## 2025-07-07 ENCOUNTER — INFUSION THERAPY VISIT (OUTPATIENT)
Dept: INFUSION THERAPY | Facility: CLINIC | Age: 75
End: 2025-07-07
Attending: STUDENT IN AN ORGANIZED HEALTH CARE EDUCATION/TRAINING PROGRAM
Payer: MEDICARE

## 2025-07-07 ENCOUNTER — OFFICE VISIT (OUTPATIENT)
Dept: URGENT CARE | Facility: URGENT CARE | Age: 75
End: 2025-07-07
Payer: MEDICARE

## 2025-07-07 VITALS
HEART RATE: 68 BPM | RESPIRATION RATE: 14 BRPM | WEIGHT: 165.1 LBS | BODY MASS INDEX: 29.25 KG/M2 | HEIGHT: 63 IN | TEMPERATURE: 98.4 F | SYSTOLIC BLOOD PRESSURE: 119 MMHG | OXYGEN SATURATION: 99 % | DIASTOLIC BLOOD PRESSURE: 70 MMHG

## 2025-07-07 VITALS
RESPIRATION RATE: 16 BRPM | OXYGEN SATURATION: 98 % | SYSTOLIC BLOOD PRESSURE: 118 MMHG | HEART RATE: 63 BPM | WEIGHT: 166.8 LBS | BODY MASS INDEX: 29.55 KG/M2 | TEMPERATURE: 98 F | DIASTOLIC BLOOD PRESSURE: 57 MMHG
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
## 2025-07-07 DIAGNOSIS — S80.861A INSECT BITE OF RIGHT LOWER LEG, INITIAL ENCOUNTER: Primary | ICD-10-CM

## 2025-07-07 DIAGNOSIS — M81.0 AGE-RELATED OSTEOPOROSIS WITHOUT CURRENT PATHOLOGICAL FRACTURE: Primary | ICD-10-CM

## 2025-07-07 PROCEDURE — 96365 THER/PROPH/DIAG IV INF INIT: CPT

## 2025-07-07 PROCEDURE — 250N000011 HC RX IP 250 OP 636: Performed by: STUDENT IN AN ORGANIZED HEALTH CARE EDUCATION/TRAINING PROGRAM

## 2025-07-07 PROCEDURE — 99213 OFFICE O/P EST LOW 20 MIN: CPT

## 2025-07-07 PROCEDURE — 3074F SYST BP LT 130 MM HG: CPT

## 2025-07-07 PROCEDURE — 3078F DIAST BP <80 MM HG: CPT

## 2025-07-07 RX ORDER — ZOLEDRONIC ACID 0.05 MG/ML
5 INJECTION, SOLUTION INTRAVENOUS ONCE
Status: CANCELLED
Start: 2026-07-07

## 2025-07-07 RX ORDER — MEPERIDINE HYDROCHLORIDE 25 MG/ML
25 INJECTION INTRAMUSCULAR; INTRAVENOUS; SUBCUTANEOUS
Status: CANCELLED | OUTPATIENT
Start: 2026-07-07

## 2025-07-07 RX ORDER — ALBUTEROL SULFATE 0.83 MG/ML
2.5 SOLUTION RESPIRATORY (INHALATION)
Status: CANCELLED | OUTPATIENT
Start: 2026-07-07

## 2025-07-07 RX ORDER — HEPARIN SODIUM,PORCINE 10 UNIT/ML
5-20 VIAL (ML) INTRAVENOUS DAILY PRN
Status: CANCELLED | OUTPATIENT
Start: 2026-07-07

## 2025-07-07 RX ORDER — ACETAMINOPHEN 325 MG/1
650 TABLET ORAL
Status: DISCONTINUED | OUTPATIENT
Start: 2025-07-07 | End: 2025-07-07

## 2025-07-07 RX ORDER — DIPHENHYDRAMINE HYDROCHLORIDE 50 MG/ML
25 INJECTION, SOLUTION INTRAMUSCULAR; INTRAVENOUS
Status: CANCELLED
Start: 2026-07-07

## 2025-07-07 RX ORDER — ALBUTEROL SULFATE 90 UG/1
1-2 INHALANT RESPIRATORY (INHALATION)
Status: CANCELLED
Start: 2026-07-07

## 2025-07-07 RX ORDER — DIPHENHYDRAMINE HYDROCHLORIDE 50 MG/ML
50 INJECTION, SOLUTION INTRAMUSCULAR; INTRAVENOUS
Status: CANCELLED
Start: 2026-07-07

## 2025-07-07 RX ORDER — HEPARIN SODIUM (PORCINE) LOCK FLUSH IV SOLN 100 UNIT/ML 100 UNIT/ML
5 SOLUTION INTRAVENOUS
Status: CANCELLED | OUTPATIENT
Start: 2026-07-07

## 2025-07-07 RX ORDER — ZOLEDRONIC ACID 0.05 MG/ML
5 INJECTION, SOLUTION INTRAVENOUS ONCE
Status: COMPLETED | OUTPATIENT
Start: 2025-07-07 | End: 2025-07-07

## 2025-07-07 RX ORDER — METHYLPREDNISOLONE SODIUM SUCCINATE 40 MG/ML
40 INJECTION INTRAMUSCULAR; INTRAVENOUS
Status: CANCELLED
Start: 2026-07-07

## 2025-07-07 RX ORDER — ACETAMINOPHEN 325 MG/1
650 TABLET ORAL
Status: CANCELLED | OUTPATIENT
Start: 2026-07-07

## 2025-07-07 RX ORDER — EPINEPHRINE 1 MG/ML
0.3 INJECTION, SOLUTION INTRAMUSCULAR; SUBCUTANEOUS EVERY 5 MIN PRN
Status: CANCELLED | OUTPATIENT
Start: 2026-07-07

## 2025-07-07 RX ADMIN — ZOLEDRONIC ACID 5 MG: 0.05 INJECTION, SOLUTION INTRAVENOUS at 10:36

## 2025-07-07 NOTE — PATIENT INSTRUCTIONS
No signs of infection today. I would be less suspicious for a clot given the known injury but something to keep an eye on if you do get worsening pain, redness, or swelling. May continue tylenol/ibuprofen as needed for symptoms, heat or ice may also be helpful.

## 2025-07-07 NOTE — PATIENT INSTRUCTIONS
Dear Monique Gupta    Thank you for choosing [facility] ([facility] for your infusion.  The following information is a summary of our appointment as well as important reminders.      If you have any questions on your upcoming Specialty Infusion appointments, please call scheduling at [phone].  It was a pleasure taking care of you today.    Sincerely,    [facility]  [address] [place] [address]  [id number]  Phone:  [phone]

## 2025-07-07 NOTE — PROGRESS NOTES
Patient presents with:  Insect Bite: Bite to right calf, unsure of what it was. Redness, swelling and tenderness around the area. Sx 3 days.       Clinical Decision Making:      ICD-10-CM    1. Insect bite of right lower leg, initial encounter  S80.861A     W57.XXXA         Patient with insect bite to her right lower calf that occurred 3 days ago.  Since then she has been having increasing tenderness around the area of the bite.  She did feel bug sting her though was not able to visualize what the insect was.  Given that she felt a sting I would have a lower suspicion for a tick bite.  There is no localized erythema or swelling that would be concerning for signs of infection at this time.  Considered DVT given tenderness to the calf and patient's history of atrial fibrillation, A-fib is controlled with atenolol, she states she is not chronically in A fib and is not anticoagulated. Heart rate regular here today. Given that there is no redness, swelling, and the area of tenderness occurred with a known trauma to the area I would have a lower suspicion for DVT, though we discussed this and discussed with any symptoms of swelling, redness, or worsening pain that she should be evaluated in the ER to rule out for DVT.  I suspect symptoms are likely secondary to insect bite.  Recommend Tylenol/ibuprofen as needed for symptoms. Patient instructions as below. Discussed red flag symptoms for when to return.       Patient Instructions   No signs of infection today. I would be less suspicious for a clot given the known injury but something to keep an eye on if you do get worsening pain, redness, or swelling. May continue tylenol/ibuprofen as needed for symptoms, heat or ice may also be helpful.     HPI:  Monique Gupta is a 75 year old female who presents today with concerns of bug bite to right calf. She felt a bug sting her (happened 3 days ago), unsure what the bug was. Since then has been having increasing tenderness  to the area of the sting. No fevers, body aches, or fatigue.        History obtained from the patient.    Problem List:  2025-05: Dysphagia, unspecified type  2024-09: Hyperthyroidism  2024-09: Atrial fibrillation with RVR (H)  2024-07: Chronic left-sided low back pain with left-sided sciatica  2024-07: Hip pain, left  2021-03: History of hepatitis C  2016-01: Eating disorder  2015-06: Anorexia  2015-04: Age-related osteoporosis without current pathological   fracture  2015-04: Dyslipidemia  2014-04: Graves disease  2014-04: Vitamin D deficiency  2013-08: Chronic tension-type headache  2012-06: Chronic obstructive asthma (H)  2011-04: Fibromyalgia  2010-04: Arthralgia of multiple sites  2009-07: Myofascial pain syndrome      Past Medical History:   Diagnosis Date    Arthritis 2006    Wrists, hip, back    COPD (chronic obstructive pulmonary disease) (H)     Heart disease 2024    A fib    History of blood transfusion 1979    Thyroid disease 2012    Uncomplicated asthma     Chronic       Social History     Tobacco Use    Smoking status: Never     Passive exposure: Never    Smokeless tobacco: Never   Substance Use Topics    Alcohol use: Yes     Comment: Social       ROS is negative other than what is noted in HPI.       Vitals:    07/07/25 1247   BP: 118/57   BP Location: Left arm   Cuff Size: Adult Large   Pulse: 63   Resp: 16   Temp: 98  F (36.7  C)   TempSrc: Tympanic   SpO2: 98%   Weight: 75.7 kg (166 lb 12.8 oz)       Physical Exam  Constitutional:       General: She is not in acute distress.     Appearance: She is not diaphoretic.   HENT:      Head: Normocephalic and atraumatic.      Right Ear: External ear normal.      Left Ear: External ear normal.   Eyes:      Conjunctiva/sclera: Conjunctivae normal.   Cardiovascular:      Rate and Rhythm: Normal rate and regular rhythm.      Pulses: Normal pulses.   Pulmonary:      Effort: Pulmonary effort is normal. No respiratory distress.   Musculoskeletal:         General:  Tenderness present. No swelling.      Comments: Mild tenderness around area of scabbing which is where bug bite occurred. No erythema or swelling. Shown in image below.   Skin:     General: Skin is warm.      Findings: No erythema.   Neurological:      General: No focal deficit present.      Mental Status: She is alert.      Sensory: No sensory deficit.   Psychiatric:         Mood and Affect: Mood normal.         Thought Content: Thought content normal.         Judgment: Judgment normal.             At the end of the encounter, I discussed results, diagnosis, medications. Discussed red flags for immediate return to clinic/ER, as well as indications for follow up if no improvement. Patient understood and agreed to plan. Patient was stable for discharge.    SHAD Teran [facility]

## 2025-07-07 NOTE — PROGRESS NOTES
"Infusion Nursing Note:  Monique Gupta presents today for Reclast Infusion (1st dose).    Patient seen by provider today: No   present during visit today: Not Applicable.    Note: Patient identification verified by name and date of birth. Assessment completed. Vitals recorded in Doc Flowsheets. Patient was provided with 1st dose education regarding medication/procedure and possible side effects. Patient verbalized understanding. No questions at this time.    Administrations This Visit       zoledronic acid (RECLAST) infusion 5 mg       Admin Date  07/07/2025 Action  $New Bag Dose  5 mg Rate  200 mL/hr Route  Intravenous Documented By  Zana Kumar RN                Intravenous Access:  Peripheral IV placed by VAT.    Treatment Conditions:  Height and Weight documented, confirmed that patient is taking Vitamin D and Calcium supplements. CrCl and Ca within treatment parameters.    Post Infusion Assessment:  Patient tolerated infusion without incident.  Blood return noted pre and post infusion.  Site patent and intact, free from redness, edema or discomfort.  No evidence of extravasations.  Access discontinued per protocol.       Discharge Plan:   Discharge instructions reviewed with: Patient.  Patient and/or family verbalized understanding of discharge instructions and all questions answered.  AVS to patient via [employer].  Patient will schedule follow-up appointment with provider to reorder again in a year for next appointment.   Patient discharged in stable condition accompanied by: SO.  Departure Mode: Ambulatory.    /70 (BP Location: Left arm, Patient Position: Sitting, Cuff Size: Adult Regular)   Pulse 68   Temp 98.4  F (36.9  C) (Oral)   Resp 14   Ht 1.6 m (5' 3\")   Wt 74.9 kg (165 lb 1.6 oz)   SpO2 99%   BMI 29.25 kg/m       Zana Kumar RN  " [facility] at [facility] ([phone]) calls stating US SOFT TISSUE UPPER EXTREMITY RIGHT    Needs to be reordered with a different DX code, as ABN is triggering for current dx. Please place new order and call Yahaira if problems/questions.

## 2025-07-07 NOTE — PROGRESS NOTES
Urgent Care Clinic Visit  Chief Complaint   Patient presents with    Insect Bite     Bite to right calf, unsure of what it was. Redness, swelling and tenderness around the area. Sx 3 days.                7/7/2025    12:47 PM   Additional Questions   Roomed by Rosetta   Accompanied by Self

## 2025-07-08 ENCOUNTER — PRE VISIT (OUTPATIENT)
Dept: ORTHOPEDICS | Facility: CLINIC | Age: 75
End: 2025-07-08

## 2025-07-09 ENCOUNTER — PATIENT OUTREACH (OUTPATIENT)
Dept: CARE COORDINATION | Facility: CLINIC | Age: 75
End: 2025-07-09
Payer: MEDICARE

## 2025-07-14 ENCOUNTER — ANCILLARY PROCEDURE (OUTPATIENT)
Dept: MAMMOGRAPHY | Facility: CLINIC | Age: 75
End: 2025-07-14
Attending: INTERNAL MEDICINE
Payer: MEDICARE

## 2025-07-14 DIAGNOSIS — Z12.31 VISIT FOR SCREENING MAMMOGRAM: ICD-10-CM

## 2025-07-14 PROCEDURE — 77063 BREAST TOMOSYNTHESIS BI: CPT | Mod: GC | Performed by: STUDENT IN AN ORGANIZED HEALTH CARE EDUCATION/TRAINING PROGRAM

## 2025-07-14 PROCEDURE — 77067 SCR MAMMO BI INCL CAD: CPT | Mod: GC | Performed by: STUDENT IN AN ORGANIZED HEALTH CARE EDUCATION/TRAINING PROGRAM

## 2025-07-21 DIAGNOSIS — M25.562 CHRONIC PAIN OF LEFT KNEE: ICD-10-CM

## 2025-07-21 DIAGNOSIS — G89.29 CHRONIC PAIN OF LEFT KNEE: ICD-10-CM

## 2025-07-21 RX ORDER — IBUPROFEN 600 MG/1
600 TABLET, FILM COATED ORAL EVERY 8 HOURS PRN
Qty: 90 TABLET | Refills: 0 | Status: SHIPPED | OUTPATIENT
Start: 2025-07-21

## 2025-07-21 SDOH — HEALTH STABILITY: PHYSICAL HEALTH: ON AVERAGE, HOW MANY MINUTES DO YOU ENGAGE IN EXERCISE AT THIS LEVEL?: 10 MIN

## 2025-07-21 SDOH — HEALTH STABILITY: PHYSICAL HEALTH: ON AVERAGE, HOW MANY DAYS PER WEEK DO YOU ENGAGE IN MODERATE TO STRENUOUS EXERCISE (LIKE A BRISK WALK)?: 0 DAYS

## 2025-07-22 DIAGNOSIS — M25.552 LEFT HIP PAIN: Primary | ICD-10-CM

## 2025-07-24 ENCOUNTER — OFFICE VISIT (OUTPATIENT)
Dept: ORTHOPEDICS | Facility: CLINIC | Age: 75
End: 2025-07-24
Payer: MEDICARE

## 2025-07-24 DIAGNOSIS — E05.00 GRAVES DISEASE: ICD-10-CM

## 2025-07-24 DIAGNOSIS — M25.552 HIP PAIN, LEFT: ICD-10-CM

## 2025-07-24 RX ORDER — ATENOLOL 25 MG/1
25 TABLET ORAL DAILY
Qty: 90 TABLET | Refills: 2 | Status: SHIPPED | OUTPATIENT
Start: 2025-07-24

## 2025-07-24 RX ORDER — TIZANIDINE 2 MG/1
2-4 TABLET ORAL 3 TIMES DAILY PRN
Qty: 30 TABLET | Refills: 0 | Status: SHIPPED | OUTPATIENT
Start: 2025-07-24

## 2025-07-24 RX ORDER — METHYLPREDNISOLONE 4 MG/1
TABLET ORAL
Qty: 21 TABLET | Refills: 0 | Status: SHIPPED | OUTPATIENT
Start: 2025-07-24

## 2025-07-24 NOTE — PATIENT INSTRUCTIONS
Take steroid in the morning with food for 6 days  Do not take ibuprofen, naproxen while taking prednisone  Take tizanidine up to three times daily for spasm  Perform home exercises as tolerated  Follow up with physical therapy  Follow up in clinic if worsening symptoms such as numbness, weakness, or if there is no improvement after 6 weeks.  This follow-up can be virtual if you prefer  Contact our clinic to schedule an injection for your left hip joint if you continue to have persistent or worsening pain in the front of the hip

## 2025-07-24 NOTE — LETTER
7/24/2025      RE: Monique Gupta  [address] [facility] [address]     Dear Colleague,    Thank you for referring your patient, Monique Gupta, to the [facility]. Please see a copy of my visit note below.      [facility]  SPORTS & ORTHOPEDIC CLINIC VISIT     Jul 24, 2025        ASSESSMENT & PLAN    75-year-old with left lower extremity pain that is likely multifactorial.  While she does have elements of anterior hip pain and exam findings consistent with osteoarthritis, her complaints of pain in her lumbar area with radiation down the anterior thigh past the knee is likely due to lumbar radiculopathy    Reviewed imaging and assessment with patient in detail  Plan for brief steroid course.  Medrol sent to the pharmacy.  She was additionally given a prescription for tizanidine which will hopefully help her symptoms at nighttime and allow her to rest more comfortably.  She has plans to follow-up with physical therapy which was encouraged.  We also discussed the possibility of a ultrasound-guided hip joint injection if she continues to have persistent or worsening problems with her anterior hip pain.  Would recommend follow-up in 6 weeks if symptoms are not improving.  Sooner if worsening    Shayne Harris MD  [facility]    -----  Chief Complaint   Patient presents with     Left Hip - Pain       SUBJECTIVE  Monique Gupta is a/an 75 year old female who is seen in consultation at the request of  Kiana Blackwell M.D. for evaluation of  left hip and leg pain.     The patient is seen by themselves.  The patient is Right handed    Onset: 1-3 years(s) ago. Reports insidious onset without acute precipitating event.  Location of Pain: left hip and leg  Worsened by: when standing feels like her leg is longer   Better with: Ibuprofen   Treatments tried: ibuprofen and gabapentin   Associated  symptoms: pain in the left leg     Orthopedic/Surgical history: NO  Social History/Occupation: No      REVIEW OF SYSTEMS:  Do you have fever, chills, weight loss? No  Do you have any vision problems? No  Do you have any chest pain or edema? No  Do you have any shortness of breath or wheezing?  No  Do you have stomach problems? No  Do you have any numbness or focal weakness? No  Do you have diabetes? No  Do you have problems with bleeding or clotting? No  Do you have an rashes or other skin lesions? No    OBJECTIVE:  There were no vitals taken for this visit.     General: alert, pleasant, no distress. sitting comfortably in chair  Back/Spine: no tenderness to palpation of spinous processes, or paraspinous musculature of lumbar spine. full ROM with flexion, extension, twisting, and side bending without pain. Straight leg raise negative bilaterally. No significant hamstring tightness noted. no pain in back with SAIMA testing bilaterally.  Pain in groin with FADIR on the left.  Neuro: SILT on bilateral lower extremities, can stand on toes and heel walk without difficulty          RADIOLOGY:    2 view xrays of left hip performed and reviewed independently demonstrating no acute findings.  Moderate DJD with significant osteophytes of the femoral head. See EMR for formal radiology report.     Additionally reviewed previous 2 view x-rays of the lumbar spine 5/23/24 which demonstrates multilevel DJD With prominent endplate changes at L2-3 with slight retrolisthesis of L2 on L3.  A lower lumbar facet arthropathy is noted.  See EMR for formal radiology report.      Again, thank you for allowing me to participate in the care of your patient.      Sincerely,    Shayne Harris MD

## 2025-07-24 NOTE — PROGRESS NOTES
[facility]  SPORTS & ORTHOPEDIC CLINIC VISIT     Jul 24, 2025        ASSESSMENT & PLAN    75-year-old with left lower extremity pain that is likely multifactorial.  While she does have elements of anterior hip pain and exam findings consistent with osteoarthritis, her complaints of pain in her lumbar area with radiation down the anterior thigh past the knee is likely due to lumbar radiculopathy    Reviewed imaging and assessment with patient in detail  Plan for brief steroid course.  Medrol sent to the pharmacy.  She was additionally given a prescription for tizanidine which will hopefully help her symptoms at nighttime and allow her to rest more comfortably.  She has plans to follow-up with physical therapy which was encouraged.  We also discussed the possibility of a ultrasound-guided hip joint injection if she continues to have persistent or worsening problems with her anterior hip pain.  Would recommend follow-up in 6 weeks if symptoms are not improving.  Sooner if worsening    Shayne Harris MD  [facility] [place]    -----  Chief Complaint   Patient presents with    Left Hip - Pain       SUBJECTIVE  Monique Gupta is a/an 75 year old female who is seen in consultation at the request of  Kiana Blackwell M.D. for evaluation of  left hip and leg pain.     The patient is seen by themselves.  The patient is Right handed    Onset: 1-3 years(s) ago. Reports insidious onset without acute precipitating event.  Location of Pain: left hip and leg  Worsened by: when standing feels like her leg is longer   Better with: Ibuprofen   Treatments tried: ibuprofen and gabapentin   Associated symptoms: pain in the left leg     Orthopedic/Surgical history: NO  Social History/Occupation: No      REVIEW OF SYSTEMS:  Do you have fever, chills, weight loss? No  Do you have any vision problems? No  Do you have any chest pain or edema? No  Do you have any shortness of  breath or wheezing?  No  Do you have stomach problems? No  Do you have any numbness or focal weakness? No  Do you have diabetes? No  Do you have problems with bleeding or clotting? No  Do you have an rashes or other skin lesions? No    OBJECTIVE:  There were no vitals taken for this visit.     General: alert, pleasant, no distress. sitting comfortably in chair  Back/Spine: no tenderness to palpation of spinous processes, or paraspinous musculature of lumbar spine. full ROM with flexion, extension, twisting, and side bending without pain. Straight leg raise negative bilaterally. No significant hamstring tightness noted. no pain in back with SAIMA testing bilaterally.  Pain in groin with FADIR on the left.  Neuro: SILT on bilateral lower extremities, can stand on toes and heel walk without difficulty          RADIOLOGY:    2 view xrays of left hip performed and reviewed independently demonstrating no acute findings.  Moderate DJD with significant osteophytes of the femoral head. See EMR for formal radiology report.     Additionally reviewed previous 2 view x-rays of the lumbar spine 5/23/24 which demonstrates multilevel DJD With prominent endplate changes at L2-3 with slight retrolisthesis of L2 on L3.  A lower lumbar facet arthropathy is noted.  See EMR for formal radiology report.

## 2025-07-26 ASSESSMENT — ACTIVITIES OF DAILY LIVING (ADL)
SPORTS_TOTAL_ITEM_SCORE: 0
GETTING_INTO_AND_OUT_OF_A_BATH: MODERATE DIFFICULTY
HEAVY_WORK: EXTREME DIFFICULTY
GOING_UP_OR_DOWN_10_STAIRS: MODERATE DIFFICULTY
SQUAT: ACTIVITY IS VERY DIFFICULT
STAND: ACTIVITY IS VERY DIFFICULT
KNEEL ON THE FRONT OF YOUR KNEE: ACTIVITY IS FAIRLY DIFFICULT
ADL_COUNT: 17
PLEASE_INDICATE_YOR_PRIMARY_REASON_FOR_REFERRAL_TO_THERAPY:: FOOT AND/OR ANKLE
SQUAT: ACTIVITY IS VERY DIFFICULT
WALKING_DOWN_STEEP_HILLS: SLIGHT DIFFICULTY
GOING DOWN 1 FLIGHT OF STAIRS: MODERATE DIFFICULTY
WALKING_UP_STEEP_HILLS: MODERATE DIFFICULTY
LIMPING: THE SYMPTOM AFFECTS MY ACTIVITY SEVERELY
WALK: ACTIVITY IS VERY DIFFICULT
HOW_WOULD_YOU_RATE_THE_OVERALL_FUNCTION_OF_YOUR_KNEE_DURING_YOUR_USUAL_DAILY_ACTIVITIES?: ABNORMAL
RECREATIONAL ACTIVITIES: EXTREME DIFFICULTY
GIVING WAY, BUCKLING OR SHIFTING OF KNEE: THE SYMPTOM AFFECTS MY ACTIVITY MODERATELY
ROLLING OVER IN BED: MODERATE DIFFICULTY
WALKING_INITIALLY: MODERATE DIFFICULTY
GETTING_INTO_OR_OUT_OF_A_CAR: MODERATE DIFFICULTY
KNEEL ON THE FRONT OF YOUR KNEE: ACTIVITY IS FAIRLY DIFFICULT
PUTTING ON SOCKS AND SHOES: MODERATE DIFFICULTY
YOUR_USUAL_HOBBIES,_RECREATIONAL_OR_SPORTING_ACTIVITIES: QUITE A BIT OF DIFFICULTY
PERFORMING_HEAVY_ACTIVITIES_AROUND_YOUR_HOME: QUITE A BIT OF DIFFICULTY
TWISTING/PIVOTING ON INVOLVED LEG: EXTREME DIFFICULTY
GOING UP 1 FLIGHT OF STAIRS: MODERATE DIFFICULTY
PLEASE_INDICATE_YOR_PRIMARY_REASON_FOR_REFERRAL_TO_THERAPY:: KNEE
GO DOWN STAIRS: ACTIVITY IS SOMEWHAT DIFFICULT
SWELLING: I DO NOT HAVE THE SYMPTOM
STEPPING UP AND DOWN CURBS: SLIGHT DIFFICULTY
WEAKNESS: THE SYMPTOM AFFECTS MY ACTIVITY SEVERELY
JUMPING: EXTREME DIFFICULTY
LIFTING_AN_OBJECT,_LIKE_A_BAG_OF_GROCERIES_FROM_THE_FLOOR: MODERATE DIFFICULTY
ROLLING_OVER_IN_BED: A LITTLE BIT OF DIFFICULTY
RISE FROM A CHAIR: ACTIVITY IS VERY DIFFICULT
WALKING_UP_STEEP_HILLS: MODERATE DIFFICULTY
STARTING_AND_STOPPING_QUICKLY: EXTREME DIFFICULTY
RUNNING_ON_UNEVEN_GROUND: EXTREME DIFFICULTY OR UNABLE TO PERFORM ACTIVITY
GOING_UP_1_FLIGHT_OF_STAIRS: MODERATE DIFFICULTY
RUNNING_ONE_MILE: UNABLE TO DO
WALKING_FOR_APPROXIMATELY_10_MINUTES: MODERATE DIFFICULTY
GO DOWN STAIRS: ACTIVITY IS SOMEWHAT DIFFICULT
GETTING_INTO_AND_OUT_OF_AN_AVERAGE_CAR: MODERATE DIFFICULTY
SITTING FOR 15 MINUTES: MODERATE DIFFICULTY
ADL_SCORE(%): 0
HOS_ADL_HIGHEST_POTENTIAL_SCORE: 68
LEFS_RAW_SCORE: 0
WALKING_INITIALLY: MODERATE DIFFICULTY
RISE FROM A CHAIR: ACTIVITY IS VERY DIFFICULT
ROLLING_OVER_IN_BED: MODERATE DIFFICULTY
GO UP STAIRS: ACTIVITY IS VERY DIFFICULT
HOW_WOULD_YOU_RATE_YOUR_CURRENT_LEVEL_OF_FUNCTION?: SEVERELY ABNORMAL
WALKING_15_MINUTES_OR_GREATER: EXTREME DIFFICULTY
RUNNING_ON_EVEN_GROUND: EXTREME DIFFICULTY OR UNABLE TO PERFORM ACTIVITY
SWINGING_OBJECTS_LIKE_A_GOLF_CLUB: EXTREME DIFFICULTY
MAKING_SHARP_TURNS_WHILE_RUNNING_FAST: EXTREME DIFFICULTY OR UNABLE TO PERFORM ACTIVITY
HOS_ADL_SCORE(%): 44.12
DEEP_SQUATTING: EXTREME DIFFICULTY
DEEP SQUATTING: EXTREME DIFFICULTY
PAIN: THE SYMPTOM AFFECTS MY ACTIVITY SEVERELY
CUTTING/LATERAL_MOVEMENTS: EXTREME DIFFICULTY
ADL_HIGHEST_POTENTIAL_SCORE: 68
STANDING_FOR_15_MINUTES: MODERATE DIFFICULTY
LIMPING: THE SYMPTOM AFFECTS MY ACTIVITY SEVERELY
WALKING_2_BLOCKS: A LITTLE BIT OF DIFFICULTY
STIFFNESS: THE SYMPTOM AFFECTS MY ACTIVITY SEVERELY
GETTING INTO AND OUT OF AN AVERAGE CAR: MODERATE DIFFICULTY
SWELLING: I DO NOT HAVE THE SYMPTOM
HEAVY_WORK: EXTREME DIFFICULTY
ABILITY_TO_PERFORM_ACTIVITY_WITH_YOUR_NORMAL_TECHNIQUE: UNABLE TO DO
PAIN: THE SYMPTOM AFFECTS MY ACTIVITY SEVERELY
RECREATIONAL_ACTIVITIES: EXTREME DIFFICULTY
SPORTS_COUNT: 9
ADL_TOTAL_ITEM_SCORE: 0
SIT WITH YOUR KNEE BENT: ACTIVITY IS VERY DIFFICULT
STIFFNESS: THE SYMPTOM AFFECTS MY ACTIVITY SEVERELY
AS_A_RESULT_OF_YOUR_KNEE_INJURY,_HOW_WOULD_YOU_RATE_YOUR_CURRENT_LEVEL_OF_DAILY_ACTIVITY?: ABNORMAL
AS_A_RESULT_OF_YOUR_KNEE_INJURY,_HOW_WOULD_YOU_RATE_YOUR_CURRENT_LEVEL_OF_DAILY_ACTIVITY?: ABNORMAL
WALKING_APPROXIMATELY_10_MINUTES: MODERATE DIFFICULTY
RAW_SCORE: 22
SQUATTING: MODERATE DIFFICULTY
SHOPPING: EXTREME DIFFICULTY OR UNABLE TO PERFORM ACTIVITY
HOS_ADL_ITEM_SCORE_TOTAL: 30
SIT WITH YOUR KNEE BENT: ACTIVITY IS VERY DIFFICULT
GO UP STAIRS: ACTIVITY IS VERY DIFFICULT
WALK: ACTIVITY IS VERY DIFFICULT
WALKING_DOWN_STEEP_HILLS: SLIGHT DIFFICULTY
TWISTING/PIVOTING_ON_INVOLVED_LEG: EXTREME DIFFICULTY
ABILITY_TO_PARTICIPATE_IN_YOUR_DESIRED_SPORT_AS_LONG_AS_YOU_WOULD_LIKE: UNABLE TO DO
LOW_IMPACT_ACTIVITIES_LIKE_FAST_WALKING: EXTREME DIFFICULTY
PUTTING_ON_YOUR_SHOES_OR_SOCKS: QUITE A BIT OF DIFFICULTY
WALKING_15_MINUTES_OR_GREATER: EXTREME DIFFICULTY
HOW_WOULD_YOU_RATE_THE_OVERALL_FUNCTION_OF_YOUR_KNEE_DURING_YOUR_USUAL_DAILY_ACTIVITIES?: ABNORMAL
SPORTS_HIGHEST_POTENTIAL_SCORE: 36
STANDING_FOR_1_HOUR: MODERATE DIFFICULTY
STANDING FOR 15 MINUTES: MODERATE DIFFICULTY
SPORTS_SCORE(%): 0
GOING_DOWN_1_FLIGHT_OF_STAIRS: MODERATE DIFFICULTY
SITTING_FOR_15_MINUTES: MODERATE DIFFICULTY
ANY_OF_YOUR_USUAL_WORK,_HOUSEWORK_OR_SCHOOL_ACTIVITIES: MODERATE DIFFICULTY
STAND: ACTIVITY IS VERY DIFFICULT
LEFS_SCORE(%): 0
LANDING: EXTREME DIFFICULTY
GETTING_INTO_AND_OUT_OF_A_BATHTUB: MODERATE DIFFICULTY
KNEE_ACTIVITY_OF_DAILY_LIVING_SUM: 22
KNEE_ACTIVITY_OF_DAILY_LIVING_SCORE: 31.43
STEPPING_UP_AND_DOWN_CURBS: SLIGHT DIFFICULTY
PUTTING_ON_SOCKS_AND_SHOES: MODERATE DIFFICULTY
WALKING_A_MILE: EXTREME DIFFICULTY OR UNABLE TO PERFORM ACTIVITY
WALKING_BETWEEN_ROOMS: A LITTLE BIT OF DIFFICULTY
WEAKNESS: THE SYMPTOM AFFECTS MY ACTIVITY SEVERELY
LIGHT_TO_MODERATE_WORK: EXTREME DIFFICULTY
GETTING_INTO_AND_OUT_OF_A_BATHTUB: MODERATE DIFFICULTY
SITTING_FOR_1_HOUR: MODERATE DIFFICULTY
PERFORMING_LIGHT_ACTIVITIES_AROUND_YOUR_HOME: MODERATE DIFFICULTY
GIVING WAY, BUCKLING OR SHIFTING OF KNEE: THE SYMPTOM AFFECTS MY ACTIVITY MODERATELY
PLEASE_INDICATE_YOR_PRIMARY_REASON_FOR_REFERRAL_TO_THERAPY:: HIP
LIGHT_TO_MODERATE_WORK: EXTREME DIFFICULTY

## 2025-07-29 ENCOUNTER — THERAPY VISIT (OUTPATIENT)
Dept: PHYSICAL THERAPY | Facility: CLINIC | Age: 75
End: 2025-07-29
Attending: INTERNAL MEDICINE
Payer: MEDICARE

## 2025-07-29 DIAGNOSIS — M25.552 HIP PAIN, LEFT: ICD-10-CM

## 2025-07-29 PROCEDURE — 97161 PT EVAL LOW COMPLEX 20 MIN: CPT | Mod: GP

## 2025-07-29 PROCEDURE — 97110 THERAPEUTIC EXERCISES: CPT | Mod: GP

## 2025-07-29 PROCEDURE — 97140 MANUAL THERAPY 1/> REGIONS: CPT | Mod: GP
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
## 2025-07-30 ENCOUNTER — ANCILLARY PROCEDURE (OUTPATIENT)
Dept: CARDIOLOGY | Facility: CLINIC | Age: 75
End: 2025-07-30
Attending: INTERNAL MEDICINE
Payer: MEDICARE

## 2025-07-30 DIAGNOSIS — R06.09 DYSPNEA ON EXERTION: ICD-10-CM

## 2025-07-30 LAB — LVEF ECHO: NORMAL

## 2025-07-30 PROCEDURE — 93306 TTE W/DOPPLER COMPLETE: CPT | Performed by: INTERNAL MEDICINE

## 2025-08-04 ENCOUNTER — LAB (OUTPATIENT)
Dept: LAB | Facility: CLINIC | Age: 75
End: 2025-08-04
Attending: STUDENT IN AN ORGANIZED HEALTH CARE EDUCATION/TRAINING PROGRAM
Payer: MEDICARE

## 2025-08-04 PROCEDURE — 84480 ASSAY TRIIODOTHYRONINE (T3): CPT | Performed by: STUDENT IN AN ORGANIZED HEALTH CARE EDUCATION/TRAINING PROGRAM

## 2025-08-04 PROCEDURE — 99000 SPECIMEN HANDLING OFFICE-LAB: CPT | Performed by: PATHOLOGY

## 2025-08-11 ENCOUNTER — OFFICE VISIT (OUTPATIENT)
Dept: ENDOCRINOLOGY | Facility: CLINIC | Age: 75
End: 2025-08-11
Payer: MEDICARE

## 2025-08-11 VITALS
SYSTOLIC BLOOD PRESSURE: 99 MMHG | BODY MASS INDEX: 30.48 KG/M2 | OXYGEN SATURATION: 98 % | WEIGHT: 172 LBS | DIASTOLIC BLOOD PRESSURE: 65 MMHG | HEART RATE: 49 BPM | HEIGHT: 63 IN

## 2025-08-11 DIAGNOSIS — E66.811 CLASS 1 OBESITY WITH SERIOUS COMORBIDITY AND BODY MASS INDEX (BMI) OF 30.0 TO 30.9 IN ADULT, UNSPECIFIED OBESITY TYPE: Primary | ICD-10-CM

## 2025-08-11 DIAGNOSIS — E05.00 GRAVES DISEASE: ICD-10-CM

## 2025-08-11 PROCEDURE — 3074F SYST BP LT 130 MM HG: CPT | Performed by: STUDENT IN AN ORGANIZED HEALTH CARE EDUCATION/TRAINING PROGRAM

## 2025-08-11 PROCEDURE — 3078F DIAST BP <80 MM HG: CPT | Performed by: STUDENT IN AN ORGANIZED HEALTH CARE EDUCATION/TRAINING PROGRAM

## 2025-08-11 PROCEDURE — 1125F AMNT PAIN NOTED PAIN PRSNT: CPT | Performed by: STUDENT IN AN ORGANIZED HEALTH CARE EDUCATION/TRAINING PROGRAM

## 2025-08-11 PROCEDURE — 99215 OFFICE O/P EST HI 40 MIN: CPT | Performed by: STUDENT IN AN ORGANIZED HEALTH CARE EDUCATION/TRAINING PROGRAM

## 2025-08-11 PROCEDURE — G2211 COMPLEX E/M VISIT ADD ON: HCPCS | Performed by: STUDENT IN AN ORGANIZED HEALTH CARE EDUCATION/TRAINING PROGRAM

## 2025-08-11 RX ORDER — METHIMAZOLE 10 MG/1
10 TABLET ORAL DAILY
Qty: 90 TABLET | Refills: 3 | Status: SHIPPED | OUTPATIENT
Start: 2025-08-11

## 2025-08-11 ASSESSMENT — PAIN SCALES - GENERAL: PAINLEVEL_OUTOF10: SEVERE PAIN (8)

## 2025-08-26 ENCOUNTER — TELEPHONE (OUTPATIENT)
Dept: GASTROENTEROLOGY | Facility: CLINIC | Age: 75
End: 2025-08-26
Payer: MEDICARE

## 2025-08-27 ENCOUNTER — THERAPY VISIT (OUTPATIENT)
Dept: PHYSICAL THERAPY | Facility: CLINIC | Age: 75
End: 2025-08-27
Payer: MEDICARE

## 2025-08-27 DIAGNOSIS — M25.552 HIP PAIN, LEFT: Primary | ICD-10-CM

## 2025-08-27 PROCEDURE — 97032 APPL MODALITY 1+ESTIM EA 15: CPT | Mod: GP

## 2025-08-27 PROCEDURE — 97110 THERAPEUTIC EXERCISES: CPT | Mod: GP

## 2025-09-04 ENCOUNTER — PRE VISIT (OUTPATIENT)
Dept: SURGERY | Facility: CLINIC | Age: 75
End: 2025-09-04

## 2025-09-04 ENCOUNTER — VIRTUAL VISIT (OUTPATIENT)
Dept: SURGERY | Facility: CLINIC | Age: 75
End: 2025-09-04
Payer: MEDICARE

## 2025-09-04 VITALS — WEIGHT: 172 LBS | BODY MASS INDEX: 30.48 KG/M2 | HEIGHT: 63 IN

## 2025-09-04 DIAGNOSIS — Z01.818 PREOP EXAMINATION: Primary | ICD-10-CM

## 2025-09-04 DIAGNOSIS — R19.5 POSITIVE COLORECTAL CANCER SCREENING USING COLOGUARD TEST: ICD-10-CM

## 2025-09-04 ASSESSMENT — ENCOUNTER SYMPTOMS
DYSRHYTHMIAS: 1
ORTHOPNEA: 0

## 2025-09-04 ASSESSMENT — COPD QUESTIONNAIRES: COPD: 1
